# Patient Record
Sex: FEMALE | Race: WHITE | NOT HISPANIC OR LATINO | Employment: PART TIME | URBAN - METROPOLITAN AREA
[De-identification: names, ages, dates, MRNs, and addresses within clinical notes are randomized per-mention and may not be internally consistent; named-entity substitution may affect disease eponyms.]

---

## 2017-10-23 ENCOUNTER — TELEPHONE (OUTPATIENT)
Dept: ENDOCRINOLOGY | Facility: CLINIC | Age: 47
End: 2017-10-23

## 2017-10-23 DIAGNOSIS — C73 PAPILLARY THYROID CARCINOMA (H): ICD-10-CM

## 2017-10-23 NOTE — TELEPHONE ENCOUNTER
[facility] Call Center    Phone Message    Name of Caller: Addi    Phone Number: Home number on file [phone]    Best time to return call: ANY    May a detailed message be left on voicemail: yes    Relation to patient: Self    Reason for Call: Other: Patient will need refills, Please advise what she should do since Tasma is booked until end of February     Action Taken: Message routed to:  Adult Clinics: Endocrinology p [phone]

## 2017-10-24 RX ORDER — LEVOTHYROXINE SODIUM 75 MCG
75 TABLET ORAL DAILY
Qty: 120 TABLET | Refills: 0 | Status: SHIPPED | OUTPATIENT
Start: 2017-10-24 | End: 2017-12-02

## 2017-12-02 DIAGNOSIS — C73 PAPILLARY THYROID CARCINOMA (H): ICD-10-CM

## 2017-12-04 RX ORDER — LEVOTHYROXINE SODIUM 75 MCG
TABLET ORAL
Qty: 90 TABLET | Refills: 0 | Status: SHIPPED | OUTPATIENT
Start: 2017-12-04 | End: 2018-05-10

## 2018-02-26 ENCOUNTER — OFFICE VISIT (OUTPATIENT)
Dept: ENDOCRINOLOGY | Facility: CLINIC | Age: 48
End: 2018-02-26
Payer: COMMERCIAL

## 2018-02-26 VITALS
HEIGHT: 62 IN | HEART RATE: 102 BPM | SYSTOLIC BLOOD PRESSURE: 173 MMHG | DIASTOLIC BLOOD PRESSURE: 111 MMHG | OXYGEN SATURATION: 100 % | BODY MASS INDEX: 19.64 KG/M2 | WEIGHT: 106.7 LBS

## 2018-02-26 DIAGNOSIS — C73 PAPILLARY THYROID CARCINOMA (H): ICD-10-CM

## 2018-02-26 DIAGNOSIS — E89.0 POSTOPERATIVE HYPOTHYROIDISM: Primary | ICD-10-CM

## 2018-02-26 LAB
CALCIUM SERPL-MCNC: 8.9 MG/DL (ref 8.5–10.1)
T3 SERPL-MCNC: 93 NG/DL (ref 60–181)
T4 FREE SERPL-MCNC: 1.11 NG/DL (ref 0.76–1.46)
TSH SERPL DL<=0.005 MIU/L-ACNC: 1.04 MU/L (ref 0.4–4)

## 2018-02-26 PROCEDURE — 36415 COLL VENOUS BLD VENIPUNCTURE: CPT | Performed by: INTERNAL MEDICINE

## 2018-02-26 PROCEDURE — 82310 ASSAY OF CALCIUM: CPT | Performed by: INTERNAL MEDICINE

## 2018-02-26 PROCEDURE — 84480 ASSAY TRIIODOTHYRONINE (T3): CPT | Performed by: INTERNAL MEDICINE

## 2018-02-26 PROCEDURE — 99214 OFFICE O/P EST MOD 30 MIN: CPT | Performed by: INTERNAL MEDICINE

## 2018-02-26 PROCEDURE — 84439 ASSAY OF FREE THYROXINE: CPT | Performed by: INTERNAL MEDICINE

## 2018-02-26 PROCEDURE — 99000 SPECIMEN HANDLING OFFICE-LAB: CPT | Performed by: INTERNAL MEDICINE

## 2018-02-26 PROCEDURE — 82306 VITAMIN D 25 HYDROXY: CPT | Performed by: INTERNAL MEDICINE

## 2018-02-26 PROCEDURE — 00000344 ZZHCL STATISTIC REMEASURE THYROGLOBULIN: Mod: 90 | Performed by: INTERNAL MEDICINE

## 2018-02-26 PROCEDURE — 86800 THYROGLOBULIN ANTIBODY: CPT | Mod: 90 | Performed by: INTERNAL MEDICINE

## 2018-02-26 PROCEDURE — 84432 ASSAY OF THYROGLOBULIN: CPT | Mod: 90 | Performed by: INTERNAL MEDICINE

## 2018-02-26 PROCEDURE — 84443 ASSAY THYROID STIM HORMONE: CPT | Performed by: INTERNAL MEDICINE

## 2018-02-26 RX ORDER — LEVOTHYROXINE SODIUM 75 MCG
75 TABLET ORAL DAILY
Qty: 90 TABLET | Refills: 3 | Status: CANCELLED | OUTPATIENT
Start: 2018-02-26

## 2018-02-26 NOTE — LETTER
Patient:  Addi Flores  :   1970  MRN:     [id number]        Ms.Jayme SHEYLA Flores  [address] [facility] [address]        2018    Dear Ms.Zachman Flores,    We are writing to inform you of your test results.    Thyroglobulin (thyroid tumor marker) is stable from previous year.   Please take Synthroid 88 mcg daily and repeat lab in 6 weeks.     Resulted Orders   T4 free   Result Value Ref Range    T4 Free 1.11 0.76 - 1.46 ng/dL   TSH   Result Value Ref Range    TSH 1.04 0.40 - 4.00 mU/L   T3 total   Result Value Ref Range    Triiodothyronine (T3) 93 60 - 181 ng/dL   Thyroglobulin (Total, antibody & recovery %)   Result Value Ref Range    Lab Scanned Result THYROG-Scanned    Vitamin D Deficiency (D3 Only)   Result Value Ref Range    Vitamin D Deficiency screening 25 20 - 75 ug/L      Comment:      Season, race, dietary intake, and treatment affect the concentration of   25-hydroxy-Vitamin D. Values may decrease during winter months and increase   during summer months. Values 20-29 ug/L may indicate Vitamin D insufficiency   and values <20 ug/L may indicate Vitamin D deficiency.  Vitamin D determination is routinely performed by an immunoassay specific for   25 hydroxyvitamin D3.  If an individual is on vitamin D2 (ergocalciferol)   supplementation, please specify 25 OH vitamin D2 and D3 level determination by   LCMSMS test [id number].     Calcium   Result Value Ref Range    Calcium 8.9 8.5 - 10.1 mg/dL     If you have any questions, please do not hesitate to call [facility] at [phone] and ask for Endocrinology clinic.    Sincerely,    Ifrah Rodrigues MD  Endocrinology          [phone]  1970

## 2018-02-26 NOTE — NURSING NOTE
"Addi Flores's goals for this visit include: Follow up Papillary Thyroid Cancer  She requests these members of her care team be copied on today's visit information: YES    PCP: Nichelle Melo    Referring Provider:  No referring provider defined for this encounter.    Chief Complaint   Patient presents with     RECHECK     Papillary Thyroid Cancer       Initial Ht 1.575 m (5' 2\")  Wt 48.4 kg (106 lb 11.2 oz)  LMP 06/21/2008  BMI 19.52 kg/m2 Estimated body mass index is 19.52 kg/(m^2) as calculated from the following:    Height as of this encounter: 1.575 m (5' 2\").    Weight as of this encounter: 48.4 kg (106 lb 11.2 oz).  Medication Reconciliation: complete    Do you need any medication refills at today's visit? YES    "

## 2018-02-26 NOTE — MR AVS SNAPSHOT
After Visit Summary   2/26/2018    Addi Flores    MRN: [id number]           Patient Information     Date Of Birth          1970        Visit Information        Provider Department      2/26/2018 1:00 PM Ifrah Rodrigues MD [facility]        Today's Diagnoses     Papillary thyroid carcinoma (H)          Care Instructions    CALCIUM Recommendation 1200 mg/day in divided dose of no more than 500 mg/dose. This includes what is in your food and also what is in any supplements you are taking.      Dietary sources of calcium: These also contain vitamin D  Milk / buttermilk              8 oz            300 mg  Dry milk powder       5 Tbsp             300 mg  Yogurt                          1 cup           400 mg  Ice cream                    1/2 cup          100 mg  Hard cheese                     1.5 oz          300 mg  Cottage cheese                1/2 cup        65 mg  Spinach, cooked          1 cup              240 mg  Tofu, soft regular           4 oz          120 to 390 mg  Sardines                       3 oz               370 mg  Mackerel canned         3 oz                250 mg  Canned salmon with bones 3 oz        170-210 mg  Calcium fortified cereals are available  SILK soy and almond milk products are calcium fortified  Orange juice  Fortified with Calcium       8 oz            300 mg  Low fat dairy sources are recommended      Recommended exercise goals:    30 minutes of moderate exercise on most days of the week .  Weight bearing exercise (ie, walking, jogging, hiking, dancing)    Strength training 2 or more times/week in addition to other weight -being exercise.  Strength training -- uses weight or resistance beyond that seen in everyday activities -(pilates, weight training with free weights, weight machines or resistance bands)    Please allow 7-10 business days for your lab results. You will be notified by phone, letter, or My Chart after the  provider has reviewed them.  Thank you.     Please call [phone] to schedule follow up in one year.          Follow-ups after your visit        Follow-up notes from your care team     Return in about 1 year (around 2/26/2019) for Lab Work Today.      Future tests that were ordered for you today     Open Future Orders        Priority Expected Expires Ordered    T4 free Routine 2/26/2018 2/26/2019 2/26/2018    TSH Routine 2/26/2018 2/26/2019 2/26/2018    T3 total Routine 2/26/2018 2/26/2019 2/26/2018    Thyroglobulin (Total, antibody & recovery %) Routine 2/26/2018 2/26/2019 2/26/2018    Vitamin D Deficiency (D3 Only) Routine 2/26/2018 2/26/2019 2/26/2018    Calcium Routine 2/26/2018 2/26/2019 2/26/2018            Who to contact     If you have questions or need follow up information about today's clinic visit or your schedule please contact [facility] directly at [phone].  Normal or non-critical lab and imaging results will be communicated to you by Isabella Oliverhart, letter or phone within 4 business days after the clinic has received the results. If you do not hear from us within 7 days, please contact the clinic through Isabella Oliverhart or phone. If you have a critical or abnormal lab result, we will notify you by phone as soon as possible.  Submit refill requests through Dig[employer] or call your pharmacy and they will forward the refill request to us. Please allow 3 business days for your refill to be completed.          Additional Information About Your Visit        Digiboo Information     Dig[employer]oo is an electronic gateway that provides easy, online access to your medical records. With Dig[employer]oo, you can request a clinic appointment, read your test results, renew a prescription or communicate with your care team.     To sign up for Digiboo visit the website at [email or web address]   You will be asked to enter the access code listed below, as well as some personal information. Please follow the  "directions to create your username and password.     Your access code is: [email or web address]  Expires: 2018  1:48 PM     Your access code will  in 90 days. If you need help or a new code, please contact your [facility] or call [phone] for assistance.        Care EveryWhere ID     This is your Care EveryWhere ID. This could be used by other organizations to access your [place] medical records  [id number]        Your Vitals Were     Pulse Height Last Period Pulse Oximetry BMI (Body Mass Index)       102 1.575 m (5' 2\") 2008 100% 19.52 kg/m2        Blood Pressure from Last 3 Encounters:   18 (!) 173/111   10/03/16 (!) 177/109   08 159/104    Weight from Last 3 Encounters:   18 48.4 kg (106 lb 11.2 oz)   10/03/16 44.7 kg (98 lb 9.6 oz)   08 41.7 kg (92 lb)               Primary Care Provider Office Phone # Fax #    Nichelle Melo [phone]       [facility] [address]  [place] [facility] [address]        Equal Access to Services     NITA DIAZ AH: Hadii aad ku hadasho Soomaali, waaxda luqadaha, qaybta kaalmada adeegyada, waxay idiin hayaan harshil khyared lapatricia bonilla. So [facility] [phone].    ATENCIÓN: Si habla español, tiene a osei disposición servicios gratuitos de asistencia lingüística. lance al [phone].    We comply with applicable federal civil rights laws and [place] laws. We do not discriminate on the basis of race, color, national origin, age, disability, sex, sexual orientation, or gender identity.            Thank you!     Thank you for choosing [facility]  for your care. Our goal is always to provide you with excellent care. Hearing back from our patients is one way we can continue to improve our services. Please take a few minutes to complete the written survey that you may receive in the mail after your visit with us. Thank you!             Your Updated Medication List - Protect others " around you: Learn how to safely use, store and throw away your medicines at www.disposemymeds.org.          This list is accurate as of 2/26/18  1:48 PM.  Always use your most recent med list.                   Brand Name Dispense Instructions for use Diagnosis    METOPROLOL SUCCINATE ER PO      Take 50 mg by mouth daily    Papillary thyroid carcinoma (H)       SYNTHROID 75 MCG tablet   Generic drug:  levothyroxine     90 tablet    TAKE 1 TABLET BY MOUTH DAILY    Papillary thyroid carcinoma (H)

## 2018-02-26 NOTE — LETTER
2/26/2018         RE: Addi Flores  [address] [facility] [address]        Dear Colleague,    Thank you for referring your patient, Addi Flores, to the [facility]. Please see a copy of my visit note below.         Endocrinology Note         Addi is a 47 year old female presents today for papillary thyroid cancer    HPI  Addi is a 47 years old female with hx of papillary thyroid cancer who is here to establish care. She was previously seen , endocrinologist, last seen 10/2016.    She was diagnosed with papillary thyroid cancer in 2007 after she noted to have palpable thyroid nodule by her dermatologist. She did have US guided FNA of the nodule that showed enlarged nuclei in the thyroid parenchyma with focally papillary architecture. There were intranuclear inclusion and the result was suspicious. Given suspicious result, she underwent thyroidectomy which was consistent with right follicular variant of papillary thyroid cancer sized 1.9x1.4x1 cm with no extrathyroidal extension and margins were negative. She received I131 radioactive iodine treatment 100.4 mCi on 9/24/2007.    She did have low stimulated thyroglobulin and negative WBS uptake in 2008.    She has had hypertension particularly while she came to doctor's office. She had full evaluation for secondary hypertension including 24 hours metanephrine, cortisol and VMA which were all negative. She told me that her home blood pressures were good. She did have hysterectomy with preserved one ovary due to ovarian cyst around 2008.     She is doing relatively well. She is currently on brand name Synthroid 75 mcg daily for the past few years. It was reduced from 88 mcg in 2015. She stated that she has always felt slightly fatigue.  She also noticed multiple joints ache in her wrist, elbow and knees.  She reports that her fingers turned white in cold temperature. She stated that she has gained weight  over the past year.  She denies chest pain, shortness of breath, temperature intolerance, altered bowel movement. She stated that she gets anxious easily.     Labs in October 2016 showed TSH 0.7, free T4 1.22, thyroglobulin 1.2 and thyroglobulin antibodies less than 0.4.    She is wondering whether I can check T3 today.  She is not taking supplement containing Biotin.    Past Medical History  Past Medical History:   Diagnosis Date     Thyroid cancer (H) 2007     Allergies  Allergies   Allergen Reactions     Penicillins      Medications  Current Outpatient Prescriptions   Medication Sig Dispense Refill     SYNTHROID 75 MCG tablet TAKE 1 TABLET BY MOUTH DAILY 90 tablet 0     METOPROLOL SUCCINATE ER PO Take 50 mg by mouth daily       Family History  family history includes HEART DISEASE in her father; Hypertension in her father and mother; Lipids in her father and mother. There is no history of Breast Cancer, Cancer - colorectal, or Prostate Cancer.     No thyroid cancer in the family.     Social History  Social History   Substance Use Topics     Smoking status: Never Smoker     Smokeless tobacco: Never Used     Alcohol use Yes      Comment: occasional   , has 2 children  She works as  in high school    ROS  Constitutional: Gradual weight gain, slightly low energy.  Eyes: no vision change, diplopia or red eyes   Neck: no difficulty swallowing, no choking, no neck pain, no neck swelling  Cardiovascular: no chest pain, palpitations  Respiratory: no dyspnea, cough, shortness of breath or wheezing   GI: no nausea, vomiting, some loose stool but diarrhea or constipation, no abdominal pain   : no change in urine, no dysuria or hematuria  Musculoskeletal:  complaint of multiple joints pain including wrist elbow and knees  Integumentary:  finger turned white in a cold temperature  Neuro: no loss of strength or sensation, no numbness or tingling, no tremor, no dizziness, no headache   Endo: no  "polyuria or polydipsia, no temperature intolerance   Heme/Lymph: no concerning bumps, no bleeding problems   Allergy: no environmental allergies   Psych: +anxiety, no sleep problems.    Physical Exam  Ht 1.575 m (5' 2\")  Wt 48.4 kg (106 lb 11.2 oz)  LMP 06/21/2008  BMI 19.52 kg/m2  Body mass index is 19.52 kg/(m^2).  Constitutional: no distress, comfortable, pleasant   Eyes: anicteric, normal extra-ocular movements, no lid lag or retraction  Neck: well healed scar, no cervical mass palpated  Cardiovascular: tachycardic, normal S1 and S2, no murmurs  Respiratory: clear to auscultation, no wheezes or crackles, normal breath sounds   Gastrointestinal:  nontender, no hepatomegaly, no masses   Musculoskeletal: no edema   Skin: no concerning lesions, no jaundice   Neurological: cranial nerves intact, 2+ reflexes at patella, normal gait, +mild tremor on outstretched hands bilaterally  Psychological: appropriate mood   Lymphatic: no cervical  lymphadenopathy.      RESULTS  Surgical pathology: 2007  Papillary thyroid cancer sized 1.9x1.4x1 cm with no extrathyroidal extension. Margins were negative and there was no evidence on the left side.    Outside lab  Lab from [facility]  7/19/2007: TPO1.5 (0-8.9).   9/14/2007: TSH 34.4, Tg 2.8 (0-33), ROXANN <1.8  11/5/2007: TSH 1.46, FT4 1.2  12/27/2007: TSH 0.05  2/25/2008: TSH 0.64  5/19/2008: TSH 0.05, FT4 1.5  8/8/2008: TSH 0.54, FT4 1.3, Tg 0.4, ROXANN <1.8  11/24/2008: TSH 0.05, FT4 1.7, Tg 1.6, ROXANN <1.8  1/29/2009: TSH 0.06, FT4 1.3  6/1/2009; TSH 0.04, FT4 1.3, Tg 0.3, ROXANN<1.8  4/20/2010: TSH 0.02, FT4 1.5, Tg 0.4, ROXANN <1.8  8/9/2010: TSH 0.02, FT4 1.4  10/21/2010: TSH 0.02, FT4 1.4  12/21/2010: TSH 0.12, FT4 1.2  7/5/2011: TSH 0.28, FT4 1.2, Tg 0.6, ROXANN<20  6/25/2012: TSH 0.27, FT4 1.4, Tg 0.9, ROXANN<20  9/12/2013: TSH 0.82, FT4 1.4, Tg 1.1, ROXANN <20  10/28/2013: TSH 0.22, FT4 1.5, Tg 0.8, ROXANN<20    Lab from  clinic  10/3/2016 showed TSH 0.7, free T4 1.22, thyroglobulin 1.2 and " thyroglobulin antibodies less than 0.4.    Lab from [facility]  6/25/14: TSH 0.53  9/24/15: TSH 0.22  12/29/15: TSH 1.09    Imaging from [facility]  9/19/2007: I123 scan -- no focal area of residual uptake within thyroid bed. There was residual uptake within the thyroid bed. 24 hour uptake 2.2%.  9/28/2007: post treatment scan -- focus of increased activity in the thyroid bed consistent with residual thyroid activity and focus of activity in the left hemipelvis which could like within the ovary.   10/4/2007: CT scan: 4.2 cm low attenuation lesion within the left adnexa likely representing of ovarian origin.   9/17/2008: US thyroid -- 5 mm echogenic area in the left thyroid bed with posterior shadowing. No evidence of residual thyroid tissue   11/26/2008: WBS -- very faint midline neck activity  4/20/2010: US thyroid -- no residual or recurrent mass seen in thyroid bed.  7/5/2011: US thyroid -- negative exam  6/25/2012: US thyroid -- postsurgical changes of thyroidectomy with no sonographic findings of recurrence.      ASSESSMENT:    Addi is a 47 years old female with hx of papillary thyroid cancer who is here to follow-up papillary thyroid cancer s/p total thyroidectomy and I131 radioactive iodine ablation    1) Papillary thyroid cancer s/p total thyroidectomy and I131 radioactive iodine ablation: diagnosed in 2007. Pathology showed follicular variant of PTC without lymph node metastasis.   Stimulated Tg was low.  Ultrasound neck in 2012 did not show any suspicious finding she is low risk.  Will check TSH, FT4 and Tg today.    2) Postsurgical/ablative hypothyroidism: She has had PTC for long time and was on suppression therapy many years after surgery. I think we do not need to keep on suppressed TSH level given low risk.   Will check TSH and FT4 today.    PLAN:   Check TSH, FT4 and Thyroglobulin today  RTC 1 year    ENDO THYROID LABS-P Latest Ref Rng & Units 2/26/2018   TSH 0.40 - 4.00 mU/L 1.04   T4 FREE  0.76 - 1.46 ng/dL 1.11   TRIIODOTHYRONINE(T3) 60 - 181 ng/dL 93     Will increase Synthroid to 88 mcg daily  Repeat lab in 2 months    Ifrah Rodrigues MD     Division of Diabetes and Endocrinology  Department of Medicine  [phone]

## 2018-02-26 NOTE — PROGRESS NOTES
Endocrinology Note         Addi is a 47 year old female presents today for papillary thyroid cancer    HPI  Addi is a 47 years old female with hx of papillary thyroid cancer who is here to establish care. She was previously seen , endocrinologist, last seen 10/2016.    She was diagnosed with papillary thyroid cancer in 2007 after she noted to have palpable thyroid nodule by her dermatologist. She did have US guided FNA of the nodule that showed enlarged nuclei in the thyroid parenchyma with focally papillary architecture. There were intranuclear inclusion and the result was suspicious. Given suspicious result, she underwent thyroidectomy which was consistent with right follicular variant of papillary thyroid cancer sized 1.9x1.4x1 cm with no extrathyroidal extension and margins were negative. She received I131 radioactive iodine treatment 100.4 mCi on 9/24/2007.    She did have low stimulated thyroglobulin and negative WBS uptake in 2008.    She has had hypertension particularly while she came to doctor's office. She had full evaluation for secondary hypertension including 24 hours metanephrine, cortisol and VMA which were all negative. She told me that her home blood pressures were good. She did have hysterectomy with preserved one ovary due to ovarian cyst around 2008.     She is doing relatively well. She is currently on brand name Synthroid 75 mcg daily for the past few years. It was reduced from 88 mcg in 2015. She stated that she has always felt slightly fatigue.  She also noticed multiple joints ache in her wrist, elbow and knees.  She reports that her fingers turned white in cold temperature. She stated that she has gained weight over the past year.  She denies chest pain, shortness of breath, temperature intolerance, altered bowel movement. She stated that she gets anxious easily.     Labs in October 2016 showed TSH 0.7, free T4 1.22, thyroglobulin 1.2 and thyroglobulin antibodies less than  "0.4.    She is wondering whether I can check T3 today.  She is not taking supplement containing Biotin.    Past Medical History  Past Medical History:   Diagnosis Date     Thyroid cancer (H) 2007     Allergies  Allergies   Allergen Reactions     Penicillins      Medications  Current Outpatient Prescriptions   Medication Sig Dispense Refill     SYNTHROID 75 MCG tablet TAKE 1 TABLET BY MOUTH DAILY 90 tablet 0     METOPROLOL SUCCINATE ER PO Take 50 mg by mouth daily       Family History  family history includes HEART DISEASE in her father; Hypertension in her father and mother; Lipids in her father and mother. There is no history of Breast Cancer, Cancer - colorectal, or Prostate Cancer.     No thyroid cancer in the family.     Social History  Social History   Substance Use Topics     Smoking status: Never Smoker     Smokeless tobacco: Never Used     Alcohol use Yes      Comment: occasional   , has 2 children  She works as  in high school    ROS  Constitutional: Gradual weight gain, slightly low energy.  Eyes: no vision change, diplopia or red eyes   Neck: no difficulty swallowing, no choking, no neck pain, no neck swelling  Cardiovascular: no chest pain, palpitations  Respiratory: no dyspnea, cough, shortness of breath or wheezing   GI: no nausea, vomiting, some loose stool but diarrhea or constipation, no abdominal pain   : no change in urine, no dysuria or hematuria  Musculoskeletal:  complaint of multiple joints pain including wrist elbow and knees  Integumentary:  finger turned white in a cold temperature  Neuro: no loss of strength or sensation, no numbness or tingling, no tremor, no dizziness, no headache   Endo: no polyuria or polydipsia, no temperature intolerance   Heme/Lymph: no concerning bumps, no bleeding problems   Allergy: no environmental allergies   Psych: +anxiety, no sleep problems.    Physical Exam  Ht 1.575 m (5' 2\")  Wt 48.4 kg (106 lb 11.2 oz)  LMP 06/21/2008  BMI " 19.52 kg/m2  Body mass index is 19.52 kg/(m^2).  Constitutional: no distress, comfortable, pleasant   Eyes: anicteric, normal extra-ocular movements, no lid lag or retraction  Neck: well healed scar, no cervical mass palpated  Cardiovascular: tachycardic, normal S1 and S2, no murmurs  Respiratory: clear to auscultation, no wheezes or crackles, normal breath sounds   Gastrointestinal:  nontender, no hepatomegaly, no masses   Musculoskeletal: no edema   Skin: no concerning lesions, no jaundice   Neurological: cranial nerves intact, 2+ reflexes at patella, normal gait, +mild tremor on outstretched hands bilaterally  Psychological: appropriate mood   Lymphatic: no cervical  lymphadenopathy.      RESULTS  Surgical pathology: 2007  Papillary thyroid cancer sized 1.9x1.4x1 cm with no extrathyroidal extension. Margins were negative and there was no evidence on the left side.    Outside lab  Lab from [facility]  7/19/2007: TPO1.5 (0-8.9).   9/14/2007: TSH 34.4, Tg 2.8 (0-33), ROXANN <1.8  11/5/2007: TSH 1.46, FT4 1.2  12/27/2007: TSH 0.05  2/25/2008: TSH 0.64  5/19/2008: TSH 0.05, FT4 1.5  8/8/2008: TSH 0.54, FT4 1.3, Tg 0.4, ROXANN <1.8  11/24/2008: TSH 0.05, FT4 1.7, Tg 1.6, ROXANN <1.8  1/29/2009: TSH 0.06, FT4 1.3  6/1/2009; TSH 0.04, FT4 1.3, Tg 0.3, ROXANN<1.8  4/20/2010: TSH 0.02, FT4 1.5, Tg 0.4, ROXANN <1.8  8/9/2010: TSH 0.02, FT4 1.4  10/21/2010: TSH 0.02, FT4 1.4  12/21/2010: TSH 0.12, FT4 1.2  7/5/2011: TSH 0.28, FT4 1.2, Tg 0.6, ROXANN<20  6/25/2012: TSH 0.27, FT4 1.4, Tg 0.9, ROXANN<20  9/12/2013: TSH 0.82, FT4 1.4, Tg 1.1, ROXANN <20  10/28/2013: TSH 0.22, FT4 1.5, Tg 0.8, ROXANN<20    Lab from [facility]  10/3/2016 showed TSH 0.7, free T4 1.22, thyroglobulin 1.2 and thyroglobulin antibodies less than 0.4.    Lab from [facility]  6/25/14: TSH 0.53  9/24/15: TSH 0.22  12/29/15: TSH 1.09    Imaging from [facility]  9/19/2007: I123 scan -- no focal area of residual uptake within thyroid bed. There was residual uptake within the  thyroid bed. 24 hour uptake 2.2%.  9/28/2007: post treatment scan -- focus of increased activity in the thyroid bed consistent with residual thyroid activity and focus of activity in the left hemipelvis which could like within the ovary.   10/4/2007: CT scan: 4.2 cm low attenuation lesion within the left adnexa likely representing of ovarian origin.   9/17/2008: US thyroid -- 5 mm echogenic area in the left thyroid bed with posterior shadowing. No evidence of residual thyroid tissue   11/26/2008: WBS -- very faint midline neck activity  4/20/2010: US thyroid -- no residual or recurrent mass seen in thyroid bed.  7/5/2011: US thyroid -- negative exam  6/25/2012: US thyroid -- postsurgical changes of thyroidectomy with no sonographic findings of recurrence.      ASSESSMENT:    Addi is a 47 years old female with hx of papillary thyroid cancer who is here to follow-up papillary thyroid cancer s/p total thyroidectomy and I131 radioactive iodine ablation    1) Papillary thyroid cancer s/p total thyroidectomy and I131 radioactive iodine ablation: diagnosed in 2007. Pathology showed follicular variant of PTC without lymph node metastasis.   Stimulated Tg was low.  Ultrasound neck in 2012 did not show any suspicious finding she is low risk.  Will check TSH, FT4 and Tg today.    2) Postsurgical/ablative hypothyroidism: She has had PTC for long time and was on suppression therapy many years after surgery. I think we do not need to keep on suppressed TSH level given low risk.   Will check TSH and FT4 today.    PLAN:   Check TSH, FT4 and Thyroglobulin today  RTC 1 year    ENDO THYROID LABS-P Latest Ref Rng & Units 2/26/2018   TSH 0.40 - 4.00 mU/L 1.04   T4 FREE 0.76 - 1.46 ng/dL 1.11   TRIIODOTHYRONINE(T3) 60 - 181 ng/dL 93     Will increase Synthroid to 88 mcg daily  Repeat lab in 2 months    Ifrah Rodrigues MD     Division of Diabetes and Endocrinology  Department of Medicine  [phone]

## 2018-02-26 NOTE — PATIENT INSTRUCTIONS
CALCIUM Recommendation 1200 mg/day in divided dose of no more than 500 mg/dose. This includes what is in your food and also what is in any supplements you are taking.      Dietary sources of calcium: These also contain vitamin D  Milk / buttermilk              8 oz            300 mg  Dry milk powder       5 Tbsp             300 mg  Yogurt                          1 cup           400 mg  Ice cream                    1/2 cup          100 mg  Hard cheese                     1.5 oz          300 mg  Cottage cheese                1/2 cup        65 mg  Spinach, cooked          1 cup              240 mg  Tofu, soft regular           4 oz          120 to 390 mg  Sardines                       3 oz               370 mg  Mackerel canned         3 oz                250 mg  Canned salmon with bones 3 oz        170-210 mg  Calcium fortified cereals are available  SILK soy and almond milk products are calcium fortified  Orange juice  Fortified with Calcium       8 oz            300 mg  Low fat dairy sources are recommended      Recommended exercise goals:    30 minutes of moderate exercise on most days of the week .  Weight bearing exercise (ie, walking, jogging, hiking, dancing)    Strength training 2 or more times/week in addition to other weight -being exercise.  Strength training -- uses weight or resistance beyond that seen in everyday activities -(pilates, weight training with free weights, weight machines or resistance bands)    Please allow 7-10 business days for your lab results. You will be notified by phone, letter, or My Chart after the provider has reviewed them.  Thank you.     Please call [phone] to schedule follow up in one year.    Discuss BP with primary

## 2018-02-27 ENCOUNTER — MYC MEDICAL ADVICE (OUTPATIENT)
Dept: ENDOCRINOLOGY | Facility: CLINIC | Age: 48
End: 2018-02-27

## 2018-02-27 DIAGNOSIS — C73 PAPILLARY CARCINOMA OF THYROID (H): Primary | ICD-10-CM

## 2018-02-27 DIAGNOSIS — E89.0 POSTOPERATIVE HYPOTHYROIDISM: ICD-10-CM

## 2018-02-27 LAB — DEPRECATED CALCIDIOL+CALCIFEROL SERPL-MC: 25 UG/L (ref 20–75)

## 2018-02-27 RX ORDER — LEVOTHYROXINE SODIUM 88 MCG
88 TABLET ORAL DAILY
Qty: 90 TABLET | Refills: 3 | Status: SHIPPED | OUTPATIENT
Start: 2018-02-27 | End: 2018-05-10 | Stop reason: DRUGHIGH

## 2018-02-28 NOTE — TELEPHONE ENCOUNTER
Patient notified of lab results and recommendations. Lab orders placed. Patient will schedule lab appt in 2 months. Addi is agreeable with plan and has no further questions at this time.    Per Lilia Result Message:  Notes Recorded by Ifrah Rodrigues MD on 2/27/2018 at 3:55 PM  Please also ask her to repeat lab in 2 months after dose adjustment.    Thanks,  Ifrah Tipton LPN  Adult Endocrinology  [facility]

## 2018-02-28 NOTE — TELEPHONE ENCOUNTER
Contacted patient to review [employer] message sent to patient by Dr. Rodrigues regarding recent lab results.     Left voicemail for patient to contact our office.     Elma Burns RN  Endocrine Care Coordinator  [facility]

## 2018-03-04 ENCOUNTER — HEALTH MAINTENANCE LETTER (OUTPATIENT)
Age: 48
End: 2018-03-04

## 2018-03-07 LAB — LAB SCANNED RESULT: NORMAL

## 2018-05-01 DIAGNOSIS — C73 PAPILLARY CARCINOMA OF THYROID (H): ICD-10-CM

## 2018-05-01 DIAGNOSIS — E89.0 POSTOPERATIVE HYPOTHYROIDISM: ICD-10-CM

## 2018-05-01 LAB
T4 FREE SERPL-MCNC: 1.37 NG/DL (ref 0.76–1.46)
TSH SERPL DL<=0.005 MIU/L-ACNC: 0.07 MU/L (ref 0.4–4)

## 2018-05-01 PROCEDURE — 36415 COLL VENOUS BLD VENIPUNCTURE: CPT | Performed by: INTERNAL MEDICINE

## 2018-05-01 PROCEDURE — 84439 ASSAY OF FREE THYROXINE: CPT | Performed by: INTERNAL MEDICINE

## 2018-05-01 PROCEDURE — 84443 ASSAY THYROID STIM HORMONE: CPT | Performed by: INTERNAL MEDICINE

## 2018-05-09 ENCOUNTER — TELEPHONE (OUTPATIENT)
Dept: ENDOCRINOLOGY | Facility: CLINIC | Age: 48
End: 2018-05-09

## 2018-05-09 DIAGNOSIS — E89.0 POSTOPERATIVE HYPOTHYROIDISM: Primary | ICD-10-CM

## 2018-05-09 DIAGNOSIS — C73 PAPILLARY THYROID CARCINOMA (H): ICD-10-CM

## 2018-05-09 NOTE — TELEPHONE ENCOUNTER
[facility] Call Center    Phone Message    May a detailed message be left on voicemail: yes    Reason for Call: Patient requesting to go over lab results, patient did not think they looked good and is concerned. Requesting a call back as soon as anyone is able. Thank you    Action Taken: Message routed to:  Adult Clinics: Endocrinology p [phone]

## 2018-05-09 NOTE — TELEPHONE ENCOUNTER
5/1/18 labs as follows:    Component      Latest Ref Rng & Units 5/1/2018   TSH      0.40 - 4.00 mU/L 0.07 (L)   T4 Free      0.76 - 1.46 ng/dL 1.37       Per 2/27/18 [employer] message:  Your thyroid level is considered to be in acceptable range. However I would like to increase Synthroid back to 88 mcg to keep TSH close to 0.5.         Contacted patient to review in detail.     Patient was concerned that her TSH levels were so low. Patient was concerned she is hyperthyroid. Patient reports that she is needing less sleep and notes that her eyes are dry and she is not able to wear her contacts. Patient reports that she read online that hyperthyroidism can cause eye disease. Reviewed with patient Graves eye disease. Patient verbalizes understanding. Also advised patient of above information regarding labs and Dr. Rodrigues's plan to keep TSH close to 0.5. Patient verbalizes understanding.     Advised patient that writer will contact her when Dr. Rodrigues reviews labs. Patient verbalizes understanding and agrees to plan.       Elma Burns, RN  Endocrine Care Coordinator  [facility]

## 2018-05-10 RX ORDER — LEVOTHYROXINE SODIUM 88 MCG
88 TABLET ORAL DAILY
Qty: 90 TABLET | Refills: 1 | Status: SHIPPED | OUTPATIENT
Start: 2018-05-10 | End: 2019-05-05

## 2018-05-10 RX ORDER — LEVOTHYROXINE SODIUM 75 MCG
75 TABLET ORAL DAILY
Qty: 90 TABLET | Refills: 1 | Status: SHIPPED | OUTPATIENT
Start: 2018-05-10 | End: 2019-05-05

## 2018-05-10 NOTE — TELEPHONE ENCOUNTER
Patient advised. Patient verbalizes understanding. Patient questioned if it would be ok to alternate 75 mcg and 88 mcg or skip some days of 88 mcg tablets?    Will send to Dr. Rodrigues to review.       Elma Burns RN  Endocrine Care Coordinator  [facility]

## 2018-05-10 NOTE — TELEPHONE ENCOUNTER
Per Dr. Rodrigues:    Please let her know 88 mcg of LT4 is too much. We will need to go back to 75 mcg daily. Her symptoms are likely from the dose being too much. Repeat lab in 2 months after dose reduction.     Ifrah         Left voicemail for patient to contact our office.     Elma Burns RN  Endocrine Care Coordinator  [facility]

## 2018-08-08 DIAGNOSIS — E89.0 POSTOPERATIVE HYPOTHYROIDISM: ICD-10-CM

## 2018-08-08 DIAGNOSIS — C73 PAPILLARY THYROID CARCINOMA (H): ICD-10-CM

## 2018-08-08 LAB
T4 FREE SERPL-MCNC: 1.14 NG/DL (ref 0.76–1.46)
TSH SERPL DL<=0.005 MIU/L-ACNC: 1.63 MU/L (ref 0.4–4)

## 2018-08-08 PROCEDURE — 84439 ASSAY OF FREE THYROXINE: CPT | Performed by: INTERNAL MEDICINE

## 2018-08-08 PROCEDURE — 36415 COLL VENOUS BLD VENIPUNCTURE: CPT | Performed by: INTERNAL MEDICINE

## 2018-08-08 PROCEDURE — 84443 ASSAY THYROID STIM HORMONE: CPT | Performed by: INTERNAL MEDICINE

## 2019-05-05 DIAGNOSIS — E89.0 POSTOPERATIVE HYPOTHYROIDISM: ICD-10-CM

## 2019-05-05 DIAGNOSIS — C73 PAPILLARY THYROID CARCINOMA (H): ICD-10-CM

## 2019-05-06 RX ORDER — LEVOTHYROXINE SODIUM 88 MCG
TABLET ORAL
Qty: 30 TABLET | Refills: 0 | Status: SHIPPED | OUTPATIENT
Start: 2019-05-06 | End: 2019-06-03

## 2019-05-06 RX ORDER — LEVOTHYROXINE SODIUM 75 MCG
TABLET ORAL
Qty: 30 TABLET | Refills: 0 | Status: SHIPPED | OUTPATIENT
Start: 2019-05-06 | End: 2019-06-03

## 2019-05-06 NOTE — TELEPHONE ENCOUNTER
Patient last seen 2/26/18. No future appt scheduled.     Left message for patient to return call.    Silvia Tipton LPN  Clinic Coordinator  Adult Endocrinology and Pediatric Specialty Clinics  [facility]

## 2019-05-06 NOTE — TELEPHONE ENCOUNTER
Refill approved.    Silvia Tipton LPN  Diabetes Clinic Coordinator   Adult Endocrinology and Pediatric Specialty Clinics  [facility]

## 2019-06-03 ENCOUNTER — OFFICE VISIT (OUTPATIENT)
Dept: ENDOCRINOLOGY | Facility: CLINIC | Age: 49
End: 2019-06-03
Payer: COMMERCIAL

## 2019-06-03 VITALS
HEIGHT: 62 IN | DIASTOLIC BLOOD PRESSURE: 107 MMHG | WEIGHT: 102.6 LBS | SYSTOLIC BLOOD PRESSURE: 159 MMHG | OXYGEN SATURATION: 100 % | HEART RATE: 109 BPM | BODY MASS INDEX: 18.88 KG/M2

## 2019-06-03 DIAGNOSIS — C73 PAPILLARY THYROID CARCINOMA (H): Primary | ICD-10-CM

## 2019-06-03 DIAGNOSIS — E89.0 POSTOPERATIVE HYPOTHYROIDISM: ICD-10-CM

## 2019-06-03 LAB
T4 FREE SERPL-MCNC: 1.27 NG/DL (ref 0.76–1.46)
TSH SERPL DL<=0.005 MIU/L-ACNC: 0.66 MU/L (ref 0.4–4)

## 2019-06-03 PROCEDURE — 84244 ASSAY OF RENIN: CPT | Mod: 90 | Performed by: INTERNAL MEDICINE

## 2019-06-03 PROCEDURE — 86800 THYROGLOBULIN ANTIBODY: CPT | Mod: 90 | Performed by: INTERNAL MEDICINE

## 2019-06-03 PROCEDURE — 00000344 ZZHCL STATISTIC REMEASURE THYROGLOBULIN: Mod: 90 | Performed by: INTERNAL MEDICINE

## 2019-06-03 PROCEDURE — 83835 ASSAY OF METANEPHRINES: CPT | Mod: 90 | Performed by: INTERNAL MEDICINE

## 2019-06-03 PROCEDURE — 84439 ASSAY OF FREE THYROXINE: CPT | Performed by: INTERNAL MEDICINE

## 2019-06-03 PROCEDURE — 82088 ASSAY OF ALDOSTERONE: CPT | Performed by: INTERNAL MEDICINE

## 2019-06-03 PROCEDURE — 99000 SPECIMEN HANDLING OFFICE-LAB: CPT | Performed by: INTERNAL MEDICINE

## 2019-06-03 PROCEDURE — 84443 ASSAY THYROID STIM HORMONE: CPT | Performed by: INTERNAL MEDICINE

## 2019-06-03 PROCEDURE — 36415 COLL VENOUS BLD VENIPUNCTURE: CPT | Performed by: INTERNAL MEDICINE

## 2019-06-03 PROCEDURE — 99214 OFFICE O/P EST MOD 30 MIN: CPT | Performed by: INTERNAL MEDICINE

## 2019-06-03 PROCEDURE — 84432 ASSAY OF THYROGLOBULIN: CPT | Mod: 90 | Performed by: INTERNAL MEDICINE

## 2019-06-03 RX ORDER — LEVOTHYROXINE SODIUM 88 MCG
TABLET ORAL
Qty: 45 TABLET | Refills: 3 | Status: SHIPPED | OUTPATIENT
Start: 2019-06-03 | End: 2020-06-23

## 2019-06-03 RX ORDER — LEVOTHYROXINE SODIUM 75 MCG
TABLET ORAL
Qty: 45 TABLET | Refills: 3 | Status: SHIPPED | OUTPATIENT
Start: 2019-06-03 | End: 2020-06-23

## 2019-06-03 ASSESSMENT — MIFFLIN-ST. JEOR: SCORE: 1058.15

## 2019-06-03 NOTE — NURSING NOTE
Patient ok to leave per Dr. Rg, she will follow up with her primary to discuss elevated blood pressure.  Kelly Graff [facility]  Adult Endocrinology  [facility]

## 2019-06-03 NOTE — NURSING NOTE
"Addi Flores's goals for this visit include:   Chief Complaint   Patient presents with     Thyroid Disease     She requests these members of her care team be copied on today's visit information: Yes    PCP: Nichelle Melo    Referring Provider:  Nichelle Melo MD  [address]  [place] [address]    BP (!) 173/111 (BP Location: Left arm, Patient Position: Sitting, Cuff Size: Adult Regular)   Pulse 109   Ht 1.574 m (5' 1.97\")   Wt 46.5 kg (102 lb 9.6 oz)   LMP 06/21/2008   SpO2 100%   BMI 18.78 kg/m      Do you need any medication refills at today's visit? Yes - after labs are done    "

## 2019-06-03 NOTE — LETTER
6/3/2019         RE: Addi Flores  [address] [facility] [address]        Dear Colleague,    Thank you for referring your patient, Addi Flores, to the [facility]. Please see a copy of my visit note below.         Endocrinology Note         Addi is a 48 year old female presents today for papillary thyroid cancer    HPI  Addi is a 48 years old female with hx of papillary thyroid cancer who is here for follow up follicular variant of papillary thyroid cancer    She was diagnosed with papillary thyroid cancer in 2007 after she noted to have palpable thyroid nodule by her dermatologist. She did have US guided FNA of the nodule that showed enlarged nuclei in the thyroid parenchyma with focally papillary architecture. There were intranuclear inclusion and the result was suspicious. Given suspicious result, she underwent thyroidectomy which was consistent with right follicular variant of papillary thyroid cancer sized 1.9x1.4x1 cm with no extrathyroidal extension and margins were negative. She received I131 radioactive iodine treatment 100.4 mCi on 9/24/2007.    She did have low stimulated thyroglobulin and negative WBS uptake in 2008.    She has had hypertension particularly when she came to doctor's office. She had full evaluation for secondary hypertension including 24 hours metanephrine, cortisol and VMA which were all negative. Both systolic and diastolic blood pressure have been high. She takes metoprolol 50 mg daily. She told me that her home blood pressures were good. She did have hysterectomy with preserved one ovary due to ovarian cyst around 2008.     She is doing relatively well. She is currently on brand name Synthroid 75 mcg daily alternating with 88 mcg. She was noted to have positive CCP Ab. She also noticed multiple joints ache in her wrist, elbow and knees. She denies chest pain, shortness of breath, temperature intolerance, altered bowel movement.  She stated that she gets anxious easily.     Labs in 2/2018 showed TSH 1.04, free T4 1.11, thyroglobulin 1.7 and thyroglobulin antibodies less than 0.4.    Past Medical History  Past Medical History:   Diagnosis Date     Thyroid cancer (H) 2007     Allergies  Allergies   Allergen Reactions     Penicillins      Medications  Current Outpatient Medications   Medication Sig Dispense Refill     METOPROLOL SUCCINATE ER PO Take 50 mg by mouth daily       SYNTHROID 75 MCG tablet TAKE 1 TABLET BY MOUTH EVERY OTHER DAY, ALTERNATE WITH 88MCG 30 tablet 0     SYNTHROID 88 MCG tablet TAKE 1 TABLET BY MOUTH EVERY OTHER DAY ALTERNATE WITH 75 MCG 30 tablet 0     Family History  family history includes Heart Disease in her father; Hypertension in her father and mother; Lipids in her father and mother.     No thyroid cancer in the family.     Social History  Social History     Tobacco Use     Smoking status: Never Smoker     Smokeless tobacco: Never Used   Substance Use Topics     Alcohol use: Yes     Comment: occasional     Drug use: No   , has 2 children  She works as  in high school    ROS  Constitutional: Gradual weight gain, slightly low energy.  Eyes: no vision change, diplopia or red eyes   Neck: no difficulty swallowing, no choking, no neck pain, no neck swelling  Cardiovascular: no chest pain, palpitations  Respiratory: no dyspnea, cough, shortness of breath or wheezing   GI: no nausea, vomiting, some loose stool but diarrhea or constipation, no abdominal pain   : no change in urine, no dysuria or hematuria  Musculoskeletal:  complaint of multiple joints pain including wrist elbow and knees  Integumentary:  finger turned white in a cold temperature  Neuro: no loss of strength or sensation, no numbness or tingling, no tremor, no dizziness, no headache   Endo: no polyuria or polydipsia, no temperature intolerance   Heme/Lymph: no concerning bumps, no bleeding problems   Allergy: no environmental  "allergies   Psych: +anxiety, no sleep problems.    Physical Exam  BP (!) 159/107 (BP Location: Left arm, Patient Position: Sitting, Cuff Size: Adult Regular)   Pulse 109   Ht 1.574 m (5' 1.97\")   Wt 46.5 kg (102 lb 9.6 oz)   LMP 06/21/2008   SpO2 100%   BMI 18.78 kg/m     Body mass index is 18.78 kg/m .  Constitutional: no distress, comfortable, pleasant   Eyes: anicteric, normal extra-ocular movements, no lid lag or retraction  Neck: well healed scar, no cervical mass palpated  Cardiovascular: tachycardic, normal S1 and S2, no murmurs  Respiratory: clear to auscultation, no wheezes or crackles, normal breath sounds   Gastrointestinal:  nontender, no hepatomegaly, no masses   Musculoskeletal: no edema   Skin: no concerning lesions, no jaundice   Neurological: cranial nerves intact, 2+ reflexes at patella, normal gait, +mild tremor on outstretched hands bilaterally  Psychological: appropriate mood   Lymphatic: no cervical  lymphadenopathy.      RESULTS  Surgical pathology: 2007  Papillary thyroid cancer sized 1.9x1.4x1 cm with no extrathyroidal extension. Margins were negative and there was no evidence on the left side.    Outside lab  Lab from [facility]  7/19/2007: TPO1.5 (0-8.9).   9/14/2007: TSH 34.4, Tg 2.8 (0-33), RXOANN <1.8  11/5/2007: TSH 1.46, FT4 1.2  12/27/2007: TSH 0.05  2/25/2008: TSH 0.64  5/19/2008: TSH 0.05, FT4 1.5  8/8/2008: TSH 0.54, FT4 1.3, Tg 0.4, ROXANN <1.8  11/24/2008: TSH 0.05, FT4 1.7, Tg 1.6, ROXANN <1.8  1/29/2009: TSH 0.06, FT4 1.3  6/1/2009; TSH 0.04, FT4 1.3, Tg 0.3, ROXANN<1.8  4/20/2010: TSH 0.02, FT4 1.5, Tg 0.4, ROXANN <1.8  8/9/2010: TSH 0.02, FT4 1.4  10/21/2010: TSH 0.02, FT4 1.4  12/21/2010: TSH 0.12, FT4 1.2  7/5/2011: TSH 0.28, FT4 1.2, Tg 0.6, ROXANN<20  6/25/2012: TSH 0.27, FT4 1.4, Tg 0.9, ROXANN<20  9/12/2013: TSH 0.82, FT4 1.4, Tg 1.1, ROXANN <20  10/28/2013: TSH 0.22, FT4 1.5, Tg 0.8, ORXANN<20    Lab from  clinic  10/3/2016: TSH 0.7, free T4 1.22, thyroglobulin 1.2 and thyroglobulin " antibodies less than 0.4.  2/26/2018:  TSH 1.04, free T4 1.11, thyroglobulin 1.7 and thyroglobulin antibodies less than 0.4.  8/8/2018: TSH 1.63, FT4 1.14  6/3/2019: TSH 0.66, FT4 1.27    Lab from [facility]  6/25/14: TSH 0.53  9/24/15: TSH 0.22  12/29/15: TSH 1.09    Imaging from [facility]  9/19/2007: I123 scan -- no focal area of residual uptake within thyroid bed. There was residual uptake within the thyroid bed. 24 hour uptake 2.2%.  9/28/2007: post treatment scan -- focus of increased activity in the thyroid bed consistent with residual thyroid activity and focus of activity in the left hemipelvis which could like within the ovary.   10/4/2007: CT scan: 4.2 cm low attenuation lesion within the left adnexa likely representing of ovarian origin.   9/17/2008: US thyroid -- 5 mm echogenic area in the left thyroid bed with posterior shadowing. No evidence of residual thyroid tissue   11/26/2008: WBS -- very faint midline neck activity  4/20/2010: US thyroid -- no residual or recurrent mass seen in thyroid bed.  7/5/2011: US thyroid -- negative exam  6/25/2012: US thyroid -- postsurgical changes of thyroidectomy with no sonographic findings of recurrence.      ASSESSMENT:    Addi is a 47 years old female with hx of papillary thyroid cancer who is here to follow-up papillary thyroid cancer s/p total thyroidectomy and I131 radioactive iodine ablation    1) Papillary thyroid cancer s/p total thyroidectomy and I131 radioactive iodine ablation: diagnosed in 2007. Pathology showed follicular variant of PTC without lymph node metastasis.   Stimulated Tg was low.  Ultrasound neck in 2012 did not show any suspicious finding she is low risk.  Will check TSH, FT4 and Tg today.    2) Postsurgical/ablative hypothyroidism: She has had PTC for long time and was on suppression therapy many years after surgery. I think we do not need to keep on suppressed TSH level given low risk.   Will check TSH and FT4 today.    3)  Hypertension: both systolic and diastolic. Did have work up for secondary hypertension in the past which were negative. Currently on metoprolol ER 50 mg daily. BP has been always high. Reports good BP at home. I recommend to see nephrologist or discuss with PCP re: trial of addition of antihypertensive medications    PLAN:   Check TSH, FT4 and Thyroglobulin today  RTC 1 year    Results for AMRIT REYES (MRN [id number]) as of 6/4/2019 17:24   Ref. Range 6/3/2019 10:23   T4 Free Latest Ref Range: 0.76 - 1.46 ng/dL 1.27   TSH Latest Ref Range: 0.40 - 4.00 mU/L 0.66   Continue LT4 75 mcg alternating with 88 mcg every other day.    Ifrah Rodrigues MD     Division of Diabetes and Endocrinology  Department of Medicine  [phone]

## 2019-06-03 NOTE — PROGRESS NOTES
Endocrinology Note         Addi is a 48 year old female presents today for papillary thyroid cancer    HPI  Addi is a 48 years old female with hx of papillary thyroid cancer who is here for follow up follicular variant of papillary thyroid cancer    She was diagnosed with papillary thyroid cancer in 2007 after she noted to have palpable thyroid nodule by her dermatologist. She did have US guided FNA of the nodule that showed enlarged nuclei in the thyroid parenchyma with focally papillary architecture. There were intranuclear inclusion and the result was suspicious. Given suspicious result, she underwent thyroidectomy which was consistent with right follicular variant of papillary thyroid cancer sized 1.9x1.4x1 cm with no extrathyroidal extension and margins were negative. She received I131 radioactive iodine treatment 100.4 mCi on 9/24/2007.    She did have low stimulated thyroglobulin and negative WBS uptake in 2008.    She has had hypertension particularly when she came to doctor's office. She had full evaluation for secondary hypertension including 24 hours metanephrine, cortisol and VMA which were all negative. Both systolic and diastolic blood pressure have been high. She takes metoprolol 50 mg daily. She told me that her home blood pressures were good. She did have hysterectomy with preserved one ovary due to ovarian cyst around 2008.     She is doing relatively well. She is currently on brand name Synthroid 75 mcg daily alternating with 88 mcg. She was noted to have positive CCP Ab. She also noticed multiple joints ache in her wrist, elbow and knees. She denies chest pain, shortness of breath, temperature intolerance, altered bowel movement. She stated that she gets anxious easily.     Labs in 2/2018 showed TSH 1.04, free T4 1.11, thyroglobulin 1.7 and thyroglobulin antibodies less than 0.4.    Past Medical History  Past Medical History:   Diagnosis Date     Thyroid cancer (H) 2007  "    Allergies  Allergies   Allergen Reactions     Penicillins      Medications  Current Outpatient Medications   Medication Sig Dispense Refill     METOPROLOL SUCCINATE ER PO Take 50 mg by mouth daily       SYNTHROID 75 MCG tablet TAKE 1 TABLET BY MOUTH EVERY OTHER DAY, ALTERNATE WITH 88MCG 30 tablet 0     SYNTHROID 88 MCG tablet TAKE 1 TABLET BY MOUTH EVERY OTHER DAY ALTERNATE WITH 75 MCG 30 tablet 0     Family History  family history includes Heart Disease in her father; Hypertension in her father and mother; Lipids in her father and mother.     No thyroid cancer in the family.     Social History  Social History     Tobacco Use     Smoking status: Never Smoker     Smokeless tobacco: Never Used   Substance Use Topics     Alcohol use: Yes     Comment: occasional     Drug use: No   , has 2 children  She works as  in high school    ROS  Constitutional: Gradual weight gain, slightly low energy.  Eyes: no vision change, diplopia or red eyes   Neck: no difficulty swallowing, no choking, no neck pain, no neck swelling  Cardiovascular: no chest pain, palpitations  Respiratory: no dyspnea, cough, shortness of breath or wheezing   GI: no nausea, vomiting, some loose stool but diarrhea or constipation, no abdominal pain   : no change in urine, no dysuria or hematuria  Musculoskeletal:  complaint of multiple joints pain including wrist elbow and knees  Integumentary:  finger turned white in a cold temperature  Neuro: no loss of strength or sensation, no numbness or tingling, no tremor, no dizziness, no headache   Endo: no polyuria or polydipsia, no temperature intolerance   Heme/Lymph: no concerning bumps, no bleeding problems   Allergy: no environmental allergies   Psych: +anxiety, no sleep problems.    Physical Exam  BP (!) 159/107 (BP Location: Left arm, Patient Position: Sitting, Cuff Size: Adult Regular)   Pulse 109   Ht 1.574 m (5' 1.97\")   Wt 46.5 kg (102 lb 9.6 oz)   LMP 06/21/2008   " SpO2 100%   BMI 18.78 kg/m    Body mass index is 18.78 kg/m .  Constitutional: no distress, comfortable, pleasant   Eyes: anicteric, normal extra-ocular movements, no lid lag or retraction  Neck: well healed scar, no cervical mass palpated  Cardiovascular: tachycardic, normal S1 and S2, no murmurs  Respiratory: clear to auscultation, no wheezes or crackles, normal breath sounds   Gastrointestinal:  nontender, no hepatomegaly, no masses   Musculoskeletal: no edema   Skin: no concerning lesions, no jaundice   Neurological: cranial nerves intact, 2+ reflexes at patella, normal gait, +mild tremor on outstretched hands bilaterally  Psychological: appropriate mood   Lymphatic: no cervical  lymphadenopathy.      RESULTS  Surgical pathology: 2007  Papillary thyroid cancer sized 1.9x1.4x1 cm with no extrathyroidal extension. Margins were negative and there was no evidence on the left side.    Outside lab  Lab from [facility]  7/19/2007: TPO1.5 (0-8.9).   9/14/2007: TSH 34.4, Tg 2.8 (0-33), ROXANN <1.8  11/5/2007: TSH 1.46, FT4 1.2  12/27/2007: TSH 0.05  2/25/2008: TSH 0.64  5/19/2008: TSH 0.05, FT4 1.5  8/8/2008: TSH 0.54, FT4 1.3, Tg 0.4, ROXANN <1.8  11/24/2008: TSH 0.05, FT4 1.7, Tg 1.6, ROXANN <1.8  1/29/2009: TSH 0.06, FT4 1.3  6/1/2009; TSH 0.04, FT4 1.3, Tg 0.3, ROXANN<1.8  4/20/2010: TSH 0.02, FT4 1.5, Tg 0.4, ROXANN <1.8  8/9/2010: TSH 0.02, FT4 1.4  10/21/2010: TSH 0.02, FT4 1.4  12/21/2010: TSH 0.12, FT4 1.2  7/5/2011: TSH 0.28, FT4 1.2, Tg 0.6, ROXANN<20  6/25/2012: TSH 0.27, FT4 1.4, Tg 0.9, ROXANN<20  9/12/2013: TSH 0.82, FT4 1.4, Tg 1.1, ROXANN <20  10/28/2013: TSH 0.22, FT4 1.5, Tg 0.8, ROXANN<20    Lab from [facility]  10/3/2016: TSH 0.7, free T4 1.22, thyroglobulin 1.2 and thyroglobulin antibodies less than 0.4.  2/26/2018:  TSH 1.04, free T4 1.11, thyroglobulin 1.7 and thyroglobulin antibodies less than 0.4.  8/8/2018: TSH 1.63, FT4 1.14  6/3/2019: TSH 0.66, FT4 1.27    Lab from [facility]  6/25/14: TSH 0.53  9/24/15: TSH  0.22  12/29/15: TSH 1.09    Imaging from [facility]  9/19/2007: I123 scan -- no focal area of residual uptake within thyroid bed. There was residual uptake within the thyroid bed. 24 hour uptake 2.2%.  9/28/2007: post treatment scan -- focus of increased activity in the thyroid bed consistent with residual thyroid activity and focus of activity in the left hemipelvis which could like within the ovary.   10/4/2007: CT scan: 4.2 cm low attenuation lesion within the left adnexa likely representing of ovarian origin.   9/17/2008: US thyroid -- 5 mm echogenic area in the left thyroid bed with posterior shadowing. No evidence of residual thyroid tissue   11/26/2008: WBS -- very faint midline neck activity  4/20/2010: US thyroid -- no residual or recurrent mass seen in thyroid bed.  7/5/2011: US thyroid -- negative exam  6/25/2012: US thyroid -- postsurgical changes of thyroidectomy with no sonographic findings of recurrence.      ASSESSMENT:    Addi is a 47 years old female with hx of papillary thyroid cancer who is here to follow-up papillary thyroid cancer s/p total thyroidectomy and I131 radioactive iodine ablation    1) Papillary thyroid cancer s/p total thyroidectomy and I131 radioactive iodine ablation: diagnosed in 2007. Pathology showed follicular variant of PTC without lymph node metastasis.   Stimulated Tg was low.  Ultrasound neck in 2012 did not show any suspicious finding she is low risk.  Will check TSH, FT4 and Tg today.    2) Postsurgical/ablative hypothyroidism: She has had PTC for long time and was on suppression therapy many years after surgery. I think we do not need to keep on suppressed TSH level given low risk.   Will check TSH and FT4 today.    3) Hypertension: both systolic and diastolic. Did have work up for secondary hypertension in the past which were negative. Currently on metoprolol ER 50 mg daily. BP has been always high. Reports good BP at home. I recommend to see nephrologist or discuss  with PCP re: trial of addition of antihypertensive medications    PLAN:   Check TSH, FT4 and Thyroglobulin today  RTC 1 year    Results for AMRIT REYES (MRN [id number]) as of 6/4/2019 17:24   Ref. Range 6/3/2019 10:23   T4 Free Latest Ref Range: 0.76 - 1.46 ng/dL 1.27   TSH Latest Ref Range: 0.40 - 4.00 mU/L 0.66   Continue LT4 75 mcg alternating with 88 mcg every other day.    Ifrah Rodrigues MD     Division of Diabetes and Endocrinology  Department of Medicine  [phone]

## 2019-06-04 LAB — ALDOST SERPL-MCNC: 7.6 NG/DL (ref 0–31)

## 2019-06-05 LAB — RENIN PLAS-CCNC: 1.4 NG/ML/HR

## 2019-06-06 LAB
ANNOTATION COMMENT IMP: ABNORMAL
METANEPHS SERPL-SCNC: 0.55 NMOL/L (ref 0–0.49)
NORMETANEPHRINE SERPL-SCNC: 0.45 NMOL/L (ref 0–0.89)

## 2019-06-10 LAB — LAB SCANNED RESULT: NORMAL

## 2020-02-23 ENCOUNTER — HEALTH MAINTENANCE LETTER (OUTPATIENT)
Age: 50
End: 2020-02-23

## 2020-06-08 ENCOUNTER — VIRTUAL VISIT (OUTPATIENT)
Dept: ENDOCRINOLOGY | Facility: CLINIC | Age: 50
End: 2020-06-08
Payer: COMMERCIAL

## 2020-06-08 DIAGNOSIS — E89.0 POSTOPERATIVE HYPOTHYROIDISM: ICD-10-CM

## 2020-06-08 DIAGNOSIS — C73 PAPILLARY THYROID CARCINOMA (H): Primary | ICD-10-CM

## 2020-06-08 PROCEDURE — 99212 OFFICE O/P EST SF 10 MIN: CPT | Mod: TEL | Performed by: INTERNAL MEDICINE

## 2020-06-08 NOTE — PROGRESS NOTES
"Addi Flores is a 49 year old female who is being evaluated via a billable telephone visit.      The patient has been notified of following:     \"This telephone visit will be conducted via a call between you and your physician/provider. We have found that certain health care needs can be provided without the need for a physical exam.  This service lets us provide the care you need with a short phone conversation.  If a prescription is necessary we can send it directly to your pharmacy.  If lab work is needed we can place an order for that and you can then stop by our lab to have the test done at a later time.    Telephone visits are billed at different rates depending on your insurance coverage. During this emergency period, for some insurers they may be billed the same as an in-person visit.  Please reach out to your insurance provider with any questions.    If during the course of the call the physician/provider feels a telephone visit is not appropriate, you will not be charged for this service.\"    Patient has given verbal consent for Telephone visit?  Yes    What phone number would you like to be contacted at? [phone]    How would you like to obtain your AVS? MyChart          Endocrinology Note         Addi is a 49 year old femalewho has telephone visit today for papillary thyroid cancer    HPI  Addi is a 49 years old female with anxiety, hx of papillary thyroid cancer who has telephone visit for follow up follicular variant of papillary thyroid cancer    She was diagnosed with papillary thyroid cancer in 2007 after she noted to have palpable thyroid nodule by her dermatologist. She did have US guided FNA of the nodule that showed enlarged nuclei in the thyroid parenchyma with focally papillary architecture. There were intranuclear inclusion and the result was suspicious. Given suspicious result, she underwent thyroidectomy which was consistent with right follicular variant of papillary " thyroid cancer sized 1.9x1.4x1 cm with no extrathyroidal extension and margins were negative. She received I131 radioactive iodine treatment 100.4 mCi on 9/24/2007.    She did have low stimulated thyroglobulin and negative WBS uptake in 2008.    She has had hypertension particularly when she came to doctor's office. She had full evaluation for secondary hypertension including 24 hours metanephrine, cortisol and VMA which were all negative. Both systolic and diastolic blood pressure have been high. She takes metoprolol 50 mg daily. She told me that her home blood pressures were good. She did have hysterectomy with preserved one ovary due to ovarian cyst around 2008.     Interim history  She has been off over the past few months during COVID outbreak. She has been doing very well since last week.  She is currently on brand name Synthroid 75 mcg daily alternating with 88 mcg. She denies chest pain, shortness of breath, temperature intolerance, altered bowel movement. She has had some insomnia that is related to anxiety. She has not notice any lump in her neck. No difficulty swallowing or breathing.    Labs on 6/3/2019 showed TSH 0.66, FT4 1.27, thyroglobulin 1.5, TgAb <0.4    Past Medical History  Past Medical History:   Diagnosis Date     Thyroid cancer (H) 2007     Allergies  Allergies   Allergen Reactions     Penicillins      Medications  Current Outpatient Medications   Medication Sig Dispense Refill     METOPROLOL SUCCINATE ER PO Take 50 mg by mouth daily       SYNTHROID 75 MCG tablet TAKE 1 TABLET BY MOUTH EVERY OTHER DAY, ALTERNATE WITH 88MCG 45 tablet 3     SYNTHROID 88 MCG tablet TAKE 1 TABLET BY MOUTH EVERY OTHER DAY ALTERNATE WITH 75 MCG 45 tablet 3     Family History  family history includes Heart Disease in her father; Hypertension in her father and mother; Lipids in her father and mother.     No thyroid cancer in the family.     Social History  Social History     Tobacco Use     Smoking status: Never  Smoker     Smokeless tobacco: Never Used   Substance Use Topics     Alcohol use: Yes     Comment: occasional     Drug use: No   , has 2 children  She works as  in high school    ROS  10 points ROS were negative otherwise mentioned in HPI  Has intermittent anxiety    Physical Exam  Constitutional: no distress, comfortable, pleasant     RESULTS  Surgical pathology: 2007  Papillary thyroid cancer sized 1.9x1.4x1 cm with no extrathyroidal extension. Margins were negative and there was no evidence on the left side.    Outside lab  Lab from [facility]  7/19/2007: TPO1.5 (0-8.9).   9/14/2007: TSH 34.4, Tg 2.8 (0-33), ROXANN <1.8  11/5/2007: TSH 1.46, FT4 1.2  12/27/2007: TSH 0.05  2/25/2008: TSH 0.64  5/19/2008: TSH 0.05, FT4 1.5  8/8/2008: TSH 0.54, FT4 1.3, Tg 0.4, ROXANN <1.8  11/24/2008: TSH 0.05, FT4 1.7, Tg 1.6, ROXANN <1.8  1/29/2009: TSH 0.06, FT4 1.3  6/1/2009; TSH 0.04, FT4 1.3, Tg 0.3, ROXANN<1.8  4/20/2010: TSH 0.02, FT4 1.5, Tg 0.4, ROXANN <1.8  8/9/2010: TSH 0.02, FT4 1.4  10/21/2010: TSH 0.02, FT4 1.4  12/21/2010: TSH 0.12, FT4 1.2  7/5/2011: TSH 0.28, FT4 1.2, Tg 0.6, ROXANN<20  6/25/2012: TSH 0.27, FT4 1.4, Tg 0.9, ROXANN<20  9/12/2013: TSH 0.82, FT4 1.4, Tg 1.1, ROXANN <20  10/28/2013: TSH 0.22, FT4 1.5, Tg 0.8, ROXANN<20    Lab from [facility]  6/25/14: TSH 0.53  9/24/15: TSH 0.22  12/29/15: TSH 1.09    Lab from [facility]  10/3/2016: TSH 0.7, free T4 1.22, thyroglobulin 1.2 and thyroglobulin antibodies less than 0.4.  2/26/2018:  TSH 1.04, free T4 1.11, thyroglobulin 1.7 and thyroglobulin antibodies less than 0.4.  8/8/2018: TSH 1.63, FT4 1.14  6/3/2019: TSH 0.66, FT4 1.27, thyroglobulin 1.5, TgAb <0.4    Imaging from [facility]  9/19/2007: I123 scan -- no focal area of residual uptake within thyroid bed. There was residual uptake within the thyroid bed. 24 hour uptake 2.2%.  9/28/2007: post treatment scan -- focus of increased activity in the thyroid bed consistent with residual thyroid activity  and focus of activity in the left hemipelvis which could like within the ovary.   10/4/2007: CT scan: 4.2 cm low attenuation lesion within the left adnexa likely representing of ovarian origin.   9/17/2008: US thyroid -- 5 mm echogenic area in the left thyroid bed with posterior shadowing. No evidence of residual thyroid tissue   11/26/2008: WBS -- very faint midline neck activity  4/20/2010: US thyroid -- no residual or recurrent mass seen in thyroid bed.  7/5/2011: US thyroid -- negative exam  6/25/2012: US thyroid -- postsurgical changes of thyroidectomy with no sonographic findings of recurrence.      ASSESSMENT:    Addi is a 49 years old female with hx of papillary thyroid cancer who has telephone visit to follow-up papillary thyroid cancer s/p total thyroidectomy and I131 radioactive iodine ablation    1) Papillary thyroid cancer s/p total thyroidectomy and I131 radioactive iodine ablation: diagnosed in 2007. Pathology showed follicular variant of PTC without lymph node metastasis.   Stimulated Tg was low.   Lab on 6/3/2019 showed TSH 0.66, FT4 1.27, thyroglobulin 1.5, TgAb <0.4 Ultrasound neck in 2012 did not show any suspicious finding she is low risk.  Will check TSH, FT4 and Tg today.    2) Postsurgical/ablative hypothyroidism: She has had PTC for long time and was on suppression therapy many years after surgery. I think we do not need to keep on suppressed TSH level given low risk.   Will check TSH and FT4 today.    3) Hypertension: both systolic and diastolic. Did have work up for secondary hypertension in the past which were negative. Currently on metoprolol ER 50 mg daily.  Reports normal home BP.    PLAN:   Check TSH, FT4 and Thyroglobulin today  RTC 1 year    Phone start time: 12:24 pm  Phone end time: 12:31 pm  Phone call duration: 7 minutes    Ifrah Rodrigues MD     Division of Diabetes and Endocrinology  Department of Medicine  [phone]

## 2020-06-08 NOTE — LETTER
"    6/8/2020         RE: Addi Flores  [address] [facility] [address]        Dear Colleague,    Thank you for referring your patient, Addi Flores, to the [facility]. Please see a copy of my visit note below.    Addi Flores is a 49 year old female who is being evaluated via a billable telephone visit.      The patient has been notified of following:     \"This telephone visit will be conducted via a call between you and your physician/provider. We have found that certain health care needs can be provided without the need for a physical exam.  This service lets us provide the care you need with a short phone conversation.  If a prescription is necessary we can send it directly to your pharmacy.  If lab work is needed we can place an order for that and you can then stop by our lab to have the test done at a later time.    Telephone visits are billed at different rates depending on your insurance coverage. During this emergency period, for some insurers they may be billed the same as an in-person visit.  Please reach out to your insurance provider with any questions.    If during the course of the call the physician/provider feels a telephone visit is not appropriate, you will not be charged for this service.\"    Patient has given verbal consent for Telephone visit?  Yes    What phone number would you like to be contacted at? [phone]    How would you like to obtain your AVS? MyChart          Endocrinology Note         Addi is a 49 year old femalewho has telephone visit today for papillary thyroid cancer    HPI  Addi is a 49 years old female with anxiety, hx of papillary thyroid cancer who has telephone visit for follow up follicular variant of papillary thyroid cancer    She was diagnosed with papillary thyroid cancer in 2007 after she noted to have palpable thyroid nodule by her dermatologist. She did have US guided FNA of the nodule that showed " enlarged nuclei in the thyroid parenchyma with focally papillary architecture. There were intranuclear inclusion and the result was suspicious. Given suspicious result, she underwent thyroidectomy which was consistent with right follicular variant of papillary thyroid cancer sized 1.9x1.4x1 cm with no extrathyroidal extension and margins were negative. She received I131 radioactive iodine treatment 100.4 mCi on 9/24/2007.    She did have low stimulated thyroglobulin and negative WBS uptake in 2008.    She has had hypertension particularly when she came to doctor's office. She had full evaluation for secondary hypertension including 24 hours metanephrine, cortisol and VMA which were all negative. Both systolic and diastolic blood pressure have been high. She takes metoprolol 50 mg daily. She told me that her home blood pressures were good. She did have hysterectomy with preserved one ovary due to ovarian cyst around 2008.     Interim history  She has been off over the past few months during COVID outbreak. She has been doing very well since last week.  She is currently on brand name Synthroid 75 mcg daily alternating with 88 mcg. She denies chest pain, shortness of breath, temperature intolerance, altered bowel movement. She has had some insomnia that is related to anxiety. She has not notice any lump in her neck. No difficulty swallowing or breathing.    Labs on 6/3/2019 showed TSH 0.66, FT4 1.27, thyroglobulin 1.5, TgAb <0.4    Past Medical History  Past Medical History:   Diagnosis Date     Thyroid cancer (H) 2007     Allergies  Allergies   Allergen Reactions     Penicillins      Medications  Current Outpatient Medications   Medication Sig Dispense Refill     METOPROLOL SUCCINATE ER PO Take 50 mg by mouth daily       SYNTHROID 75 MCG tablet TAKE 1 TABLET BY MOUTH EVERY OTHER DAY, ALTERNATE WITH 88MCG 45 tablet 3     SYNTHROID 88 MCG tablet TAKE 1 TABLET BY MOUTH EVERY OTHER DAY ALTERNATE WITH 75 MCG 45 tablet 3      Family History  family history includes Heart Disease in her father; Hypertension in her father and mother; Lipids in her father and mother.     No thyroid cancer in the family.     Social History  Social History     Tobacco Use     Smoking status: Never Smoker     Smokeless tobacco: Never Used   Substance Use Topics     Alcohol use: Yes     Comment: occasional     Drug use: No   , has 2 children  She works as  in high school    ROS  10 points ROS were negative otherwise mentioned in HPI  Has intermittent anxiety    Physical Exam  Constitutional: no distress, comfortable, pleasant     RESULTS  Surgical pathology: 2007  Papillary thyroid cancer sized 1.9x1.4x1 cm with no extrathyroidal extension. Margins were negative and there was no evidence on the left side.    Outside lab  Lab from [facility]  7/19/2007: TPO1.5 (0-8.9).   9/14/2007: TSH 34.4, Tg 2.8 (0-33), ROXANN <1.8  11/5/2007: TSH 1.46, FT4 1.2  12/27/2007: TSH 0.05  2/25/2008: TSH 0.64  5/19/2008: TSH 0.05, FT4 1.5  8/8/2008: TSH 0.54, FT4 1.3, Tg 0.4, ROXANN <1.8  11/24/2008: TSH 0.05, FT4 1.7, Tg 1.6, ROAXNN <1.8  1/29/2009: TSH 0.06, FT4 1.3  6/1/2009; TSH 0.04, FT4 1.3, Tg 0.3, ROXANN<1.8  4/20/2010: TSH 0.02, FT4 1.5, Tg 0.4, ROXANN <1.8  8/9/2010: TSH 0.02, FT4 1.4  10/21/2010: TSH 0.02, FT4 1.4  12/21/2010: TSH 0.12, FT4 1.2  7/5/2011: TSH 0.28, FT4 1.2, Tg 0.6, ROXANN<20  6/25/2012: TSH 0.27, FT4 1.4, Tg 0.9, ROXANN<20  9/12/2013: TSH 0.82, FT4 1.4, Tg 1.1, ROXANN <20  10/28/2013: TSH 0.22, FT4 1.5, Tg 0.8, ROXANN<20    Lab from [facility]  6/25/14: TSH 0.53  9/24/15: TSH 0.22  12/29/15: TSH 1.09    Lab from [facility]  10/3/2016: TSH 0.7, free T4 1.22, thyroglobulin 1.2 and thyroglobulin antibodies less than 0.4.  2/26/2018:  TSH 1.04, free T4 1.11, thyroglobulin 1.7 and thyroglobulin antibodies less than 0.4.  8/8/2018: TSH 1.63, FT4 1.14  6/3/2019: TSH 0.66, FT4 1.27, thyroglobulin 1.5, TgAb <0.4    Imaging from [facility]  9/19/2007:  I123 scan -- no focal area of residual uptake within thyroid bed. There was residual uptake within the thyroid bed. 24 hour uptake 2.2%.  9/28/2007: post treatment scan -- focus of increased activity in the thyroid bed consistent with residual thyroid activity and focus of activity in the left hemipelvis which could like within the ovary.   10/4/2007: CT scan: 4.2 cm low attenuation lesion within the left adnexa likely representing of ovarian origin.   9/17/2008: US thyroid -- 5 mm echogenic area in the left thyroid bed with posterior shadowing. No evidence of residual thyroid tissue   11/26/2008: WBS -- very faint midline neck activity  4/20/2010: US thyroid -- no residual or recurrent mass seen in thyroid bed.  7/5/2011: US thyroid -- negative exam  6/25/2012: US thyroid -- postsurgical changes of thyroidectomy with no sonographic findings of recurrence.      ASSESSMENT:    Addi is a 49 years old female with hx of papillary thyroid cancer who has telephone visit to follow-up papillary thyroid cancer s/p total thyroidectomy and I131 radioactive iodine ablation    1) Papillary thyroid cancer s/p total thyroidectomy and I131 radioactive iodine ablation: diagnosed in 2007. Pathology showed follicular variant of PTC without lymph node metastasis.   Stimulated Tg was low.   Lab on 6/3/2019 showed TSH 0.66, FT4 1.27, thyroglobulin 1.5, TgAb <0.4 Ultrasound neck in 2012 did not show any suspicious finding she is low risk.  Will check TSH, FT4 and Tg today.    2) Postsurgical/ablative hypothyroidism: She has had PTC for long time and was on suppression therapy many years after surgery. I think we do not need to keep on suppressed TSH level given low risk.   Will check TSH and FT4 today.    3) Hypertension: both systolic and diastolic. Did have work up for secondary hypertension in the past which were negative. Currently on metoprolol ER 50 mg daily.  Reports normal home BP.    PLAN:   Check TSH, FT4 and Thyroglobulin  today  RTC 1 year    Phone start time: 12:24 pm  Phone end time: 12:31 pm  Phone call duration: 7 minutes    Ifrah Rodrigues MD     Division of Diabetes and Endocrinology  Department of Medicine  [phone]

## 2020-06-11 DIAGNOSIS — C73 PAPILLARY THYROID CARCINOMA (H): ICD-10-CM

## 2020-06-11 DIAGNOSIS — E89.0 POSTOPERATIVE HYPOTHYROIDISM: ICD-10-CM

## 2020-06-11 LAB
T4 FREE SERPL-MCNC: 1.32 NG/DL (ref 0.76–1.46)
TSH SERPL DL<=0.005 MIU/L-ACNC: 0.41 MU/L (ref 0.4–4)

## 2020-06-11 PROCEDURE — 84432 ASSAY OF THYROGLOBULIN: CPT | Mod: 90 | Performed by: INTERNAL MEDICINE

## 2020-06-11 PROCEDURE — 99000 SPECIMEN HANDLING OFFICE-LAB: CPT | Performed by: INTERNAL MEDICINE

## 2020-06-11 PROCEDURE — 84439 ASSAY OF FREE THYROXINE: CPT | Performed by: INTERNAL MEDICINE

## 2020-06-11 PROCEDURE — 86800 THYROGLOBULIN ANTIBODY: CPT | Mod: 90 | Performed by: INTERNAL MEDICINE

## 2020-06-11 PROCEDURE — 00000344 ZZHCL STATISTIC REMEASURE THYROGLOBULIN: Mod: 90 | Performed by: INTERNAL MEDICINE

## 2020-06-11 PROCEDURE — 36415 COLL VENOUS BLD VENIPUNCTURE: CPT | Performed by: INTERNAL MEDICINE

## 2020-06-11 PROCEDURE — 84443 ASSAY THYROID STIM HORMONE: CPT | Performed by: INTERNAL MEDICINE

## 2020-06-19 LAB — LAB SCANNED RESULT: NORMAL

## 2020-06-22 DIAGNOSIS — C73 PAPILLARY THYROID CARCINOMA (H): ICD-10-CM

## 2020-06-22 DIAGNOSIS — E89.0 POSTOPERATIVE HYPOTHYROIDISM: ICD-10-CM

## 2020-06-23 RX ORDER — LEVOTHYROXINE SODIUM 88 MCG
88 TABLET ORAL EVERY OTHER DAY
Qty: 45 TABLET | Refills: 3 | Status: SHIPPED | OUTPATIENT
Start: 2020-06-23 | End: 2021-06-07

## 2020-06-23 RX ORDER — LEVOTHYROXINE SODIUM 75 MCG
75 TABLET ORAL EVERY OTHER DAY
Qty: 45 TABLET | Refills: 3 | Status: SHIPPED | OUTPATIENT
Start: 2020-06-23 | End: 2021-06-07

## 2020-12-06 ENCOUNTER — HEALTH MAINTENANCE LETTER (OUTPATIENT)
Age: 50
End: 2020-12-06

## 2021-02-20 ENCOUNTER — HEALTH MAINTENANCE LETTER (OUTPATIENT)
Age: 51
End: 2021-02-20

## 2021-04-11 ENCOUNTER — HEALTH MAINTENANCE LETTER (OUTPATIENT)
Age: 51
End: 2021-04-11

## 2021-06-07 ENCOUNTER — OFFICE VISIT (OUTPATIENT)
Dept: ENDOCRINOLOGY | Facility: CLINIC | Age: 51
End: 2021-06-07
Payer: COMMERCIAL

## 2021-06-07 VITALS
BODY MASS INDEX: 18.93 KG/M2 | DIASTOLIC BLOOD PRESSURE: 111 MMHG | SYSTOLIC BLOOD PRESSURE: 181 MMHG | OXYGEN SATURATION: 99 % | HEART RATE: 92 BPM | WEIGHT: 103.4 LBS

## 2021-06-07 DIAGNOSIS — C73 PAPILLARY THYROID CARCINOMA (H): ICD-10-CM

## 2021-06-07 DIAGNOSIS — I10 ESSENTIAL HYPERTENSION: Primary | ICD-10-CM

## 2021-06-07 DIAGNOSIS — E89.0 POSTOPERATIVE HYPOTHYROIDISM: ICD-10-CM

## 2021-06-07 LAB
T4 FREE SERPL-MCNC: 1.3 NG/DL (ref 0.76–1.46)
TSH SERPL DL<=0.005 MIU/L-ACNC: 0.45 MU/L (ref 0.4–4)

## 2021-06-07 PROCEDURE — 84443 ASSAY THYROID STIM HORMONE: CPT | Performed by: INTERNAL MEDICINE

## 2021-06-07 PROCEDURE — 83835 ASSAY OF METANEPHRINES: CPT | Mod: 90 | Performed by: INTERNAL MEDICINE

## 2021-06-07 PROCEDURE — 84439 ASSAY OF FREE THYROXINE: CPT | Performed by: INTERNAL MEDICINE

## 2021-06-07 PROCEDURE — 99215 OFFICE O/P EST HI 40 MIN: CPT | Performed by: INTERNAL MEDICINE

## 2021-06-07 PROCEDURE — 36415 COLL VENOUS BLD VENIPUNCTURE: CPT | Performed by: INTERNAL MEDICINE

## 2021-06-07 PROCEDURE — 82088 ASSAY OF ALDOSTERONE: CPT | Performed by: INTERNAL MEDICINE

## 2021-06-07 PROCEDURE — 84432 ASSAY OF THYROGLOBULIN: CPT | Mod: 90 | Performed by: INTERNAL MEDICINE

## 2021-06-07 PROCEDURE — 99000 SPECIMEN HANDLING OFFICE-LAB: CPT | Performed by: INTERNAL MEDICINE

## 2021-06-07 PROCEDURE — 86800 THYROGLOBULIN ANTIBODY: CPT | Mod: 90 | Performed by: INTERNAL MEDICINE

## 2021-06-07 PROCEDURE — 84244 ASSAY OF RENIN: CPT | Mod: 90 | Performed by: INTERNAL MEDICINE

## 2021-06-07 RX ORDER — LEVOTHYROXINE SODIUM 88 MCG
88 TABLET ORAL EVERY OTHER DAY
Qty: 45 TABLET | Refills: 3 | Status: SHIPPED | OUTPATIENT
Start: 2021-06-07 | End: 2022-04-29

## 2021-06-07 RX ORDER — LEVOTHYROXINE SODIUM 75 MCG
75 TABLET ORAL EVERY OTHER DAY
Qty: 45 TABLET | Refills: 3 | Status: SHIPPED | OUTPATIENT
Start: 2021-06-07 | End: 2022-06-23

## 2021-06-07 NOTE — PROGRESS NOTES
Endocrinology Note         Addi is a 50 year old femalewho has a visit today for papillary thyroid cancer    HPI  Addi is a 50 years old female with anxiety, hx of papillary thyroid cancer who has a visit for follow up follicular variant of papillary thyroid cancer    She was diagnosed with papillary thyroid cancer in 2007 after she noted to have palpable thyroid nodule by her dermatologist. She did have US guided FNA of the nodule that showed enlarged nuclei in the thyroid parenchyma with focally papillary architecture. There were intranuclear inclusion and the result was suspicious. Given suspicious result, she underwent thyroidectomy which was consistent with right follicular variant of papillary thyroid cancer sized 1.9x1.4x1 cm with no extrathyroidal extension and margins were negative. She received I131 radioactive iodine treatment 100.4 mCi on 9/24/2007.    She did have low stimulated thyroglobulin and negative WBS uptake in 2008.    She has had hypertension particularly when she came to doctor's office. She had full evaluation for secondary hypertension including 24 hours metanephrine, cortisol and VMA which were all negative. Both systolic and diastolic blood pressure have been high. She takes metoprolol 50 mg daily. She told me that her home blood pressures were good. She did have hysterectomy with preserved one ovary due to ovarian cyst around 2008.     Interim history  She has been feeling ok for the past year. She continues to have some anxiety. She saw her PCP for hypertension and was started on amlodipine 2.5 mg daily. However, she was afraid of side effects so she has not started it yet.     She is currently on brand name Synthroid 75 mcg daily alternating with 88 mcg. She denies chest pain, shortness of breath, temperature intolerance, altered bowel movement. She has had some insomnia that is related to anxiety. She has not notice any lump in her neck. No difficulty swallowing or  breathing.    Labs on 6/2020 showed TSH 0.41, FT4 1.32, thyroglobulin 1.3, TgAb <0.4    Past Medical History  Past Medical History:   Diagnosis Date     Thyroid cancer (H) 2007     Allergies  Allergies   Allergen Reactions     Penicillins      Medications  Current Outpatient Medications   Medication Sig Dispense Refill     METOPROLOL SUCCINATE ER PO Take 50 mg by mouth daily       SYNTHROID 75 MCG tablet Take 1 tablet (75 mcg) by mouth every other day *alternate with 88 mcg tablet. 45 tablet 3     SYNTHROID 88 MCG tablet Take 1 tablet (88 mcg) by mouth every other day *alternate with 75 mcg tablet. 45 tablet 3     Family History  family history includes Heart Disease in her father; Hypertension in her father and mother; Lipids in her father and mother.     No thyroid cancer in the family.     Social History  Social History     Tobacco Use     Smoking status: Never Smoker     Smokeless tobacco: Never Used   Substance Use Topics     Alcohol use: Yes     Comment: occasional     Drug use: No   , has 2 children  She works as  in high school    ROS  10 points ROS were negative otherwise mentioned in HPI  Has intermittent anxiety    Physical Exam  Constitutional: no distress, comfortable, pleasant     RESULTS  Surgical pathology: 2007  Papillary thyroid cancer sized 1.9x1.4x1 cm with no extrathyroidal extension. Margins were negative and there was no evidence on the left side.    Outside lab  Lab from [facility]  7/19/2007: TPO1.5 (0-8.9).   9/14/2007: TSH 34.4, Tg 2.8 (0-33), ROXANN <1.8  11/5/2007: TSH 1.46, FT4 1.2  12/27/2007: TSH 0.05  2/25/2008: TSH 0.64  5/19/2008: TSH 0.05, FT4 1.5  8/8/2008: TSH 0.54, FT4 1.3, Tg 0.4, ROXANN <1.8  11/24/2008: TSH 0.05, FT4 1.7, Tg 1.6, ROXANN <1.8  1/29/2009: TSH 0.06, FT4 1.3  6/1/2009; TSH 0.04, FT4 1.3, Tg 0.3, ROXANN<1.8  4/20/2010: TSH 0.02, FT4 1.5, Tg 0.4, ROXANN <1.8  8/9/2010: TSH 0.02, FT4 1.4  10/21/2010: TSH 0.02, FT4 1.4  12/21/2010: TSH 0.12, FT4  1.2  7/5/2011: TSH 0.28, FT4 1.2, Tg 0.6, ROXNAN<20  6/25/2012: TSH 0.27, FT4 1.4, Tg 0.9, ROXANN<20  9/12/2013: TSH 0.82, FT4 1.4, Tg 1.1, ROXANN <20  10/28/2013: TSH 0.22, FT4 1.5, Tg 0.8, ROXANN<20    Lab from [facility]  6/25/14: TSH 0.53  9/24/15: TSH 0.22  12/29/15: TSH 1.09    Lab from [facility]  10/3/2016: TSH 0.7, free T4 1.22, thyroglobulin 1.2 and thyroglobulin antibodies less than 0.4.  2/26/2018:  TSH 1.04, free T4 1.11, thyroglobulin 1.7 and thyroglobulin antibodies less than 0.4.  8/8/2018: TSH 1.63, FT4 1.14  6/3/2019: TSH 0.66, FT4 1.27, thyroglobulin 1.5, TgAb <0.4    Imaging from [facility]  9/19/2007: I123 scan -- no focal area of residual uptake within thyroid bed. There was residual uptake within the thyroid bed. 24 hour uptake 2.2%.  9/28/2007: post treatment scan -- focus of increased activity in the thyroid bed consistent with residual thyroid activity and focus of activity in the left hemipelvis which could like within the ovary.   10/4/2007: CT scan: 4.2 cm low attenuation lesion within the left adnexa likely representing of ovarian origin.   9/17/2008: US thyroid -- 5 mm echogenic area in the left thyroid bed with posterior shadowing. No evidence of residual thyroid tissue   11/26/2008: WBS -- very faint midline neck activity  4/20/2010: US thyroid -- no residual or recurrent mass seen in thyroid bed.  7/5/2011: US thyroid -- negative exam  6/25/2012: US thyroid -- postsurgical changes of thyroidectomy with no sonographic findings of recurrence.      ASSESSMENT:    Addi is a 50 years old female with hx of papillary thyroid cancer who has telephone visit to follow-up papillary thyroid cancer s/p total thyroidectomy and I131 radioactive iodine ablation    1) Papillary thyroid cancer s/p total thyroidectomy and I131 radioactive iodine ablation: diagnosed in 2007. Pathology showed follicular variant of PTC without lymph node metastasis. Ultrasound neck in 2012 did not show any suspicious finding  she is low risk.  Stimulated Tg was low.   Lab on 6/2020 showed TSH 0.41, FT4 1.32, thyroglobulin 1.3, TgAb <0.4.     Will check TSH, FT4 and Tg today.    2) Postsurgical/ablative hypothyroidism: She has had PTC for long time and was on suppression therapy many years after surgery. I think we do not need to keep on suppressed TSH level given low risk.   Will check TSH and FT4 today.    3) Hypertension: both systolic and diastolic. Did have work up for secondary hypertension in the past which were negative. Currently on metoprolol ER 50 mg daily.    Will check metanephrine and aldosterone/renin again this year  Advise her to discuss with PCP and consider adding antihypertensive medication    PLAN:   Check TSH, FT4 and Thyroglobulin, metanephrine and aldosterone/renin today  RTC 1 year    External notes/medical records independently reviewed, labs and imaging independently reviewed, medical management and tests to be discussed/communicated to patient.    Time: I spent 40 minutes spent on the date of the encounter preparing to see patient (including chart review and preparation), obtaining and or reviewing additional medical history, performing a physical exam and evaluation, documenting clinical information in the electronic health record, independently interpreting results, communicating results to the patient and coordinating care.    Ifrah Rodrigues MD     Division of Diabetes and Endocrinology  Department of Medicine  [phone]

## 2021-06-07 NOTE — LETTER
6/7/2021         RE: Addi Flores  [address] [facility] [address]        Dear Colleague,    Thank you for referring your patient, Addi Flores, to the [facility]. Please see a copy of my visit note below.    Endocrinology Note         Addi is a 50 year old femalewho has a visit today for papillary thyroid cancer    HPI  Addi is a 50 years old female with anxiety, hx of papillary thyroid cancer who has a visit for follow up follicular variant of papillary thyroid cancer    She was diagnosed with papillary thyroid cancer in 2007 after she noted to have palpable thyroid nodule by her dermatologist. She did have US guided FNA of the nodule that showed enlarged nuclei in the thyroid parenchyma with focally papillary architecture. There were intranuclear inclusion and the result was suspicious. Given suspicious result, she underwent thyroidectomy which was consistent with right follicular variant of papillary thyroid cancer sized 1.9x1.4x1 cm with no extrathyroidal extension and margins were negative. She received I131 radioactive iodine treatment 100.4 mCi on 9/24/2007.    She did have low stimulated thyroglobulin and negative WBS uptake in 2008.    She has had hypertension particularly when she came to doctor's office. She had full evaluation for secondary hypertension including 24 hours metanephrine, cortisol and VMA which were all negative. Both systolic and diastolic blood pressure have been high. She takes metoprolol 50 mg daily. She told me that her home blood pressures were good. She did have hysterectomy with preserved one ovary due to ovarian cyst around 2008.     Interim history  She has been feeling ok for the past year. She continues to have some anxiety. She saw her PCP for hypertension and was started on amlodipine 2.5 mg daily. However, she was afraid of side effects so she has not started it yet.     She is currently on brand name Synthroid  75 mcg daily alternating with 88 mcg. She denies chest pain, shortness of breath, temperature intolerance, altered bowel movement. She has had some insomnia that is related to anxiety. She has not notice any lump in her neck. No difficulty swallowing or breathing.    Labs on 6/2020 showed TSH 0.41, FT4 1.32, thyroglobulin 1.3, TgAb <0.4    Past Medical History  Past Medical History:   Diagnosis Date     Thyroid cancer (H) 2007     Allergies  Allergies   Allergen Reactions     Penicillins      Medications  Current Outpatient Medications   Medication Sig Dispense Refill     METOPROLOL SUCCINATE ER PO Take 50 mg by mouth daily       SYNTHROID 75 MCG tablet Take 1 tablet (75 mcg) by mouth every other day *alternate with 88 mcg tablet. 45 tablet 3     SYNTHROID 88 MCG tablet Take 1 tablet (88 mcg) by mouth every other day *alternate with 75 mcg tablet. 45 tablet 3     Family History  family history includes Heart Disease in her father; Hypertension in her father and mother; Lipids in her father and mother.     No thyroid cancer in the family.     Social History  Social History     Tobacco Use     Smoking status: Never Smoker     Smokeless tobacco: Never Used   Substance Use Topics     Alcohol use: Yes     Comment: occasional     Drug use: No   , has 2 children  She works as  in high school    ROS  10 points ROS were negative otherwise mentioned in HPI  Has intermittent anxiety    Physical Exam  Constitutional: no distress, comfortable, pleasant     RESULTS  Surgical pathology: 2007  Papillary thyroid cancer sized 1.9x1.4x1 cm with no extrathyroidal extension. Margins were negative and there was no evidence on the left side.    Outside lab  Lab from [facility]  7/19/2007: TPO1.5 (0-8.9).   9/14/2007: TSH 34.4, Tg 2.8 (0-33), ROXANN <1.8  11/5/2007: TSH 1.46, FT4 1.2  12/27/2007: TSH 0.05  2/25/2008: TSH 0.64  5/19/2008: TSH 0.05, FT4 1.5  8/8/2008: TSH 0.54, FT4 1.3, Tg 0.4, ROXANN <1.8  11/24/2008:  TSH 0.05, FT4 1.7, Tg 1.6, ROXANN <1.8  1/29/2009: TSH 0.06, FT4 1.3  6/1/2009; TSH 0.04, FT4 1.3, Tg 0.3, ROXANN<1.8  4/20/2010: TSH 0.02, FT4 1.5, Tg 0.4, ROXANN <1.8  8/9/2010: TSH 0.02, FT4 1.4  10/21/2010: TSH 0.02, FT4 1.4  12/21/2010: TSH 0.12, FT4 1.2  7/5/2011: TSH 0.28, FT4 1.2, Tg 0.6, ROXANN<20  6/25/2012: TSH 0.27, FT4 1.4, Tg 0.9, ROXANN<20  9/12/2013: TSH 0.82, FT4 1.4, Tg 1.1, ROXANN <20  10/28/2013: TSH 0.22, FT4 1.5, Tg 0.8, ROXANN<20    Lab from [facility]  6/25/14: TSH 0.53  9/24/15: TSH 0.22  12/29/15: TSH 1.09    Lab from [facility]  10/3/2016: TSH 0.7, free T4 1.22, thyroglobulin 1.2 and thyroglobulin antibodies less than 0.4.  2/26/2018:  TSH 1.04, free T4 1.11, thyroglobulin 1.7 and thyroglobulin antibodies less than 0.4.  8/8/2018: TSH 1.63, FT4 1.14  6/3/2019: TSH 0.66, FT4 1.27, thyroglobulin 1.5, TgAb <0.4    Imaging from [facility]  9/19/2007: I123 scan -- no focal area of residual uptake within thyroid bed. There was residual uptake within the thyroid bed. 24 hour uptake 2.2%.  9/28/2007: post treatment scan -- focus of increased activity in the thyroid bed consistent with residual thyroid activity and focus of activity in the left hemipelvis which could like within the ovary.   10/4/2007: CT scan: 4.2 cm low attenuation lesion within the left adnexa likely representing of ovarian origin.   9/17/2008: US thyroid -- 5 mm echogenic area in the left thyroid bed with posterior shadowing. No evidence of residual thyroid tissue   11/26/2008: WBS -- very faint midline neck activity  4/20/2010: US thyroid -- no residual or recurrent mass seen in thyroid bed.  7/5/2011: US thyroid -- negative exam  6/25/2012: US thyroid -- postsurgical changes of thyroidectomy with no sonographic findings of recurrence.      ASSESSMENT:    Addi is a 50 years old female with hx of papillary thyroid cancer who has telephone visit to follow-up papillary thyroid cancer s/p total thyroidectomy and I131 radioactive iodine  ablation    1) Papillary thyroid cancer s/p total thyroidectomy and I131 radioactive iodine ablation: diagnosed in 2007. Pathology showed follicular variant of PTC without lymph node metastasis. Ultrasound neck in 2012 did not show any suspicious finding she is low risk.  Stimulated Tg was low.   Lab on 6/2020 showed TSH 0.41, FT4 1.32, thyroglobulin 1.3, TgAb <0.4.     Will check TSH, FT4 and Tg today.    2) Postsurgical/ablative hypothyroidism: She has had PTC for long time and was on suppression therapy many years after surgery. I think we do not need to keep on suppressed TSH level given low risk.   Will check TSH and FT4 today.    3) Hypertension: both systolic and diastolic. Did have work up for secondary hypertension in the past which were negative. Currently on metoprolol ER 50 mg daily.    Will check metanephrine and aldosterone/renin again this year  Advise her to discuss with PCP and consider adding antihypertensive medication    PLAN:   Check TSH, FT4 and Thyroglobulin, metanephrine and aldosterone/renin today  RTC 1 year    External notes/medical records independently reviewed, labs and imaging independently reviewed, medical management and tests to be discussed/communicated to patient.    Time: I spent 40 minutes spent on the date of the encounter preparing to see patient (including chart review and preparation), obtaining and or reviewing additional medical history, performing a physical exam and evaluation, documenting clinical information in the electronic health record, independently interpreting results, communicating results to the patient and coordinating care.    Ifrah Rodrigues MD     Division of Diabetes and Endocrinology  Department of Medicine  [phone]      Again, thank you for allowing me to participate in the care of your patient.        Sincerely,        Ifrah Rodrigues MD

## 2021-06-07 NOTE — NURSING NOTE
Addi Flores's goals for this visit include:   Chief Complaint   Patient presents with     Thyroid Disease     She requests these members of her care team be copied on today's visit information: Yes    PCP: Nichelle Melo    Referring Provider:  Nichelle Melo MD  [address] [address]    BP (!) 197/131 (BP Location: Left arm, Patient Position: Sitting, Cuff Size: Adult Regular)   Pulse 92   Wt 46.9 kg (103 lb 6.4 oz)   LMP 06/21/2008   SpO2 99%   BMI 18.93 kg/m      Do you need any medication refills at today's visit? Yes

## 2021-06-11 LAB
ALDOST SERPL-MCNC: 9.7 NG/DL (ref 0–31)
ANNOTATION COMMENT IMP: ABNORMAL
METANEPHS SERPL-SCNC: 0.69 NMOL/L (ref 0–0.49)
NORMETANEPHRINE SERPL-SCNC: 0.44 NMOL/L (ref 0–0.89)

## 2021-06-14 LAB
LAB SCANNED RESULT: NORMAL
RENIN PLAS-CCNC: 0.4 NG/ML/HR

## 2021-09-25 ENCOUNTER — HEALTH MAINTENANCE LETTER (OUTPATIENT)
Age: 51
End: 2021-09-25

## 2022-03-12 ENCOUNTER — HEALTH MAINTENANCE LETTER (OUTPATIENT)
Age: 52
End: 2022-03-12

## 2022-04-29 DIAGNOSIS — E89.0 POSTOPERATIVE HYPOTHYROIDISM: ICD-10-CM

## 2022-04-29 DIAGNOSIS — C73 PAPILLARY THYROID CARCINOMA (H): Primary | ICD-10-CM

## 2022-05-07 ENCOUNTER — HEALTH MAINTENANCE LETTER (OUTPATIENT)
Age: 52
End: 2022-05-07

## 2022-06-13 ENCOUNTER — OFFICE VISIT (OUTPATIENT)
Dept: ENDOCRINOLOGY | Facility: CLINIC | Age: 52
End: 2022-06-13
Payer: COMMERCIAL

## 2022-06-13 ENCOUNTER — ANCILLARY PROCEDURE (OUTPATIENT)
Dept: ULTRASOUND IMAGING | Facility: CLINIC | Age: 52
End: 2022-06-13
Attending: INTERNAL MEDICINE
Payer: COMMERCIAL

## 2022-06-13 VITALS
WEIGHT: 103.7 LBS | SYSTOLIC BLOOD PRESSURE: 190 MMHG | HEART RATE: 100 BPM | BODY MASS INDEX: 18.99 KG/M2 | DIASTOLIC BLOOD PRESSURE: 125 MMHG | OXYGEN SATURATION: 99 %

## 2022-06-13 DIAGNOSIS — C73 PAPILLARY THYROID CARCINOMA (H): ICD-10-CM

## 2022-06-13 DIAGNOSIS — E89.0 POSTOPERATIVE HYPOTHYROIDISM: ICD-10-CM

## 2022-06-13 LAB
T4 FREE SERPL-MCNC: 1.22 NG/DL (ref 0.76–1.46)
TSH SERPL DL<=0.005 MIU/L-ACNC: 0.14 MU/L (ref 0.4–4)

## 2022-06-13 PROCEDURE — 76536 US EXAM OF HEAD AND NECK: CPT

## 2022-06-13 PROCEDURE — 84439 ASSAY OF FREE THYROXINE: CPT | Performed by: INTERNAL MEDICINE

## 2022-06-13 PROCEDURE — 84480 ASSAY TRIIODOTHYRONINE (T3): CPT | Performed by: INTERNAL MEDICINE

## 2022-06-13 PROCEDURE — 99000 SPECIMEN HANDLING OFFICE-LAB: CPT | Performed by: INTERNAL MEDICINE

## 2022-06-13 PROCEDURE — 36415 COLL VENOUS BLD VENIPUNCTURE: CPT | Performed by: INTERNAL MEDICINE

## 2022-06-13 PROCEDURE — 86800 THYROGLOBULIN ANTIBODY: CPT | Mod: 90 | Performed by: INTERNAL MEDICINE

## 2022-06-13 PROCEDURE — 84443 ASSAY THYROID STIM HORMONE: CPT | Performed by: INTERNAL MEDICINE

## 2022-06-13 PROCEDURE — 99215 OFFICE O/P EST HI 40 MIN: CPT | Performed by: INTERNAL MEDICINE

## 2022-06-13 PROCEDURE — 84432 ASSAY OF THYROGLOBULIN: CPT | Mod: 90 | Performed by: INTERNAL MEDICINE

## 2022-06-13 RX ORDER — LEVOTHYROXINE SODIUM 75 MCG
75 TABLET ORAL EVERY OTHER DAY
Qty: 45 TABLET | Refills: 3 | Status: CANCELLED | OUTPATIENT
Start: 2022-06-13

## 2022-06-13 NOTE — LETTER
6/13/2022         RE: Addi Flores  [address] [facility] [id number]        Dear Colleague,    Thank you for referring your patient, Addi Flores, to the [facility]. Please see a copy of my visit note below.    Endocrinology Note         Addi is a 51 year old femalewho has a visit today for papillary thyroid cancer    HPI  Addi is a 51 years old female with anxiety, hx of papillary thyroid cancer who has a visit for follow up follicular variant of papillary thyroid cancer    She was diagnosed with papillary thyroid cancer in 2007 after she noted to have palpable thyroid nodule by her dermatologist. She did have US guided FNA of the nodule that showed enlarged nuclei in the thyroid parenchyma with focally papillary architecture. There were intranuclear inclusion and the result was suspicious. Given suspicious result, she underwent thyroidectomy which was consistent with right follicular variant of papillary thyroid cancer sized 1.9x1.4x1 cm with no extrathyroidal extension and margins were negative. She received I131 radioactive iodine treatment 100.4 mCi on 9/24/2007.    She did have low stimulated thyroglobulin and negative WBS uptake in 2008.    She has had hypertension particularly when she came to doctor's office. She had full evaluation for secondary hypertension including 24 hours metanephrine, cortisol and VMA which were all negative. Both systolic and diastolic blood pressure have been high. She takes metoprolol 50 mg daily. She told me that her home blood pressures were good. She did have hysterectomy with preserved one ovary due to ovarian cyst around 2008.     Interim history  She continues to have anxiety. She could not sleep well. She is not currently taking medication for anxiety.     Her TFT was abnormal with TSH of 0.127, FT4 1.99 on 4/11/2022. Synthroid dose was adjusted to 75 mcg daily (from 75 mcg daily alternating with 88 mcg). She  denies chest pain, shortness of breath, temperature intolerance, altered bowel movement. She has not notice any lump in her neck. No difficulty swallowing or breathing.    Past Medical History  Past Medical History:   Diagnosis Date     Thyroid cancer (H) 2007     Allergies  Allergies   Allergen Reactions     Penicillins      Medications  Current Outpatient Medications   Medication Sig Dispense Refill     METOPROLOL SUCCINATE ER PO Take 50 mg by mouth daily       SYNTHROID 75 MCG tablet Take 1 tablet (75 mcg) by mouth every other day *alternate with 88 mcg tablet. 45 tablet 3     Family History  family history includes Heart Disease in her father; Hypertension in her father and mother; Lipids in her father and mother.     No thyroid cancer in the family.     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 Margins were negative and there was no evidence on the left side.    Outside lab  Lab from [facility]  7/19/2007: TPO1.5 (0-8.9).   9/14/2007: TSH 34.4, Tg 2.8 (0-33), ROXANN <1.8  11/5/2007: TSH 1.46, FT4 1.2  12/27/2007: TSH 0.05  2/25/2008: TSH 0.64  5/19/2008: TSH 0.05, FT4 1.5  8/8/2008: TSH 0.54, FT4 1.3, Tg 0.4, ROXANN <1.8  11/24/2008: TSH 0.05, FT4 1.7, Tg 1.6, ROXANN <1.8  1/29/2009: TSH 0.06, FT4 1.3  6/1/2009; TSH 0.04, FT4 1.3, Tg 0.3, ROXANN<1.8  4/20/2010: TSH 0.02, FT4 1.5, Tg 0.4, ROXANN <1.8  8/9/2010: TSH 0.02, FT4 1.4  10/21/2010: TSH 0.02, FT4 1.4  12/21/2010: TSH 0.12, FT4 1.2  7/5/2011: TSH 0.28, FT4 1.2, Tg 0.6, ROXANN<20  6/25/2012:  TSH 0.27, FT4 1.4, Tg 0.9, ROXANN<20  9/12/2013: TSH 0.82, FT4 1.4, Tg 1.1, ROXANN <20  10/28/2013: TSH 0.22, FT4 1.5, Tg 0.8, ROXANN<20    Lab from [facility]  6/25/14: TSH 0.53  9/24/15: TSH 0.22  12/29/15: TSH 1.09    Lab from [facility]  10/3/2016: TSH 0.7, free T4 1.22, thyroglobulin 1.2 and thyroglobulin antibodies less than 0.4.  2/26/2018:  TSH 1.04, free T4 1.11, thyroglobulin 1.7 and thyroglobulin antibodies less than 0.4.  8/8/2018: TSH 1.63, FT4 1.14  6/3/2019: TSH 0.66, FT4 1.27, thyroglobulin 1.5, TgAb <0.4  6/7/2021; TSH 0.45, FT4 1.3, thyroglobulin 1.3, TgAb<0.4    Imaging from [facility]  9/19/2007: I123 scan -- no focal area of residual uptake within thyroid bed. There was residual uptake within the thyroid bed. 24 hour uptake 2.2%.  9/28/2007: post treatment scan -- focus of increased activity in the thyroid bed consistent with residual thyroid activity and focus of activity in the left hemipelvis which could like within the ovary.   10/4/2007: CT scan: 4.2 cm low attenuation lesion within the left adnexa likely representing of ovarian origin.   9/17/2008: US thyroid -- 5 mm echogenic area in the left thyroid bed with posterior shadowing. No evidence of residual thyroid tissue   11/26/2008: WBS -- very faint midline neck activity  4/20/2010: US thyroid -- no residual or recurrent mass seen in thyroid bed.  7/5/2011: US thyroid -- negative exam  6/25/2012: US thyroid -- postsurgical changes of thyroidectomy with no sonographic findings of recurrence.      ASSESSMENT:    Addi is a 51 years old female with hx of papillary thyroid cancer who has telephone visit to follow-up papillary thyroid cancer s/p total thyroidectomy and I131 radioactive iodine ablation    1) Papillary thyroid cancer s/p total thyroidectomy and I131 radioactive iodine ablation: diagnosed in 2007. Pathology showed follicular variant of PTC without lymph node metastasis. Ultrasound neck in 2012 did not show any suspicious finding she  is low risk.  Stimulated Tg was low.     Her TFT was abnormal with TSH of 0.127, FT4 1.99 on 4/11/2022. Synthroid dose was adjusted to 75 mcg daily (from 75 mcg daily alternating with 88 mcg).    Will check TSH, FT4 and Tg today.    2) Postsurgical/ablative hypothyroidism: She has had PTC for long time and was on suppression therapy many years after surgery. I think we do not need to keep on suppressed TSH level given low risk.   Will check TSH and FT4 today.    3) Hypertension: both systolic and diastolic. Did have work up for secondary hypertension in the past which were negative. Currently on metoprolol ER 50 mg daily.    Advise her to discuss with PCP     4) Anxiety: seem to get worse during pandemic. +insomnia. Recommend her to discuss with PCP.    PLAN:   Check TSH, FT4, TT3 and Thyroglobulin and Ab  Schedule ultrasound neck  RTC 1 year    External notes/medical records independently reviewed, labs and imaging independently reviewed, medical management and tests to be discussed/communicated to patient.    Time: I spent 56 minutes spent on the date of the encounter preparing to see patient (including chart review and preparation), obtaining and or reviewing additional medical history, performing a physical exam and evaluation, documenting clinical information in the electronic health record, independently interpreting results, communicating results to the patient and coordinating care.    Ifrah Rodrigues MD, MS  Division of Diabetes and Endocrinology  Department of Medicine

## 2022-06-13 NOTE — NURSING NOTE
Addi Flores's goals for this visit include:   Chief Complaint   Patient presents with     Thyroid Disease     She requests these members of her care team be copied on today's visit information: Yes    PCP: Nichelle Melo    Referring Provider:  Nichelle Melo MD  [address] [address]    BP (!) 190/125 (BP Location: Left arm, Patient Position: Sitting, Cuff Size: Adult Regular)   Pulse 100   Wt 47 kg (103 lb 11.2 oz)   LMP 06/21/2008   SpO2 99%   BMI 18.99 kg/m      Do you need any medication refills at today's visit? Yes

## 2022-06-13 NOTE — PROGRESS NOTES
Endocrinology Note         Addi is a 51 year old femalewho has a visit today for papillary thyroid cancer    HPI  Addi is a 51 years old female with anxiety, hx of papillary thyroid cancer who has a visit for follow up follicular variant of papillary thyroid cancer    She was diagnosed with papillary thyroid cancer in 2007 after she noted to have palpable thyroid nodule by her dermatologist. She did have US guided FNA of the nodule that showed enlarged nuclei in the thyroid parenchyma with focally papillary architecture. There were intranuclear inclusion and the result was suspicious. Given suspicious result, she underwent thyroidectomy which was consistent with right follicular variant of papillary thyroid cancer sized 1.9x1.4x1 cm with no extrathyroidal extension and margins were negative. She received I131 radioactive iodine treatment 100.4 mCi on 9/24/2007.    She did have low stimulated thyroglobulin and negative WBS uptake in 2008.    She has had hypertension particularly when she came to doctor's office. She had full evaluation for secondary hypertension including 24 hours metanephrine, cortisol and VMA which were all negative. Both systolic and diastolic blood pressure have been high. She takes metoprolol 50 mg daily. She told me that her home blood pressures were good. She did have hysterectomy with preserved one ovary due to ovarian cyst around 2008.     Interim history  She continues to have anxiety. She could not sleep well. She is not currently taking medication for anxiety.     Her TFT was abnormal with TSH of 0.127, FT4 1.99 on 4/11/2022. Synthroid dose was adjusted to 75 mcg daily (from 75 mcg daily alternating with 88 mcg). She denies chest pain, shortness of breath, temperature intolerance, altered bowel movement. She has not notice any lump in her neck. No difficulty swallowing or breathing.    Past Medical History  Past Medical History:   Diagnosis Date     Thyroid cancer (H) 2007      Allergies  Allergies   Allergen Reactions     Penicillins      Medications  Current Outpatient Medications   Medication Sig Dispense Refill     METOPROLOL SUCCINATE ER PO Take 50 mg by mouth daily       SYNTHROID 75 MCG tablet Take 1 tablet (75 mcg) by mouth every other day *alternate with 88 mcg tablet. 45 tablet 3     Family History  family history includes Heart Disease in her father; Hypertension in her father and mother; Lipids in her father and mother.     No thyroid cancer in the family.     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 Margins were negative and there was no evidence on the left side.    Outside lab  Lab from [facility]  7/19/2007: TPO1.5 (0-8.9).   9/14/2007: TSH 34.4, Tg 2.8 (0-33), ROXANN <1.8  11/5/2007: TSH 1.46, FT4 1.2  12/27/2007: TSH 0.05  2/25/2008: TSH 0.64  5/19/2008: TSH 0.05, FT4 1.5  8/8/2008: TSH 0.54, FT4 1.3, Tg 0.4, ROXANN <1.8  11/24/2008: TSH 0.05, FT4 1.7, Tg 1.6, ROXANN <1.8  1/29/2009: TSH 0.06, FT4 1.3  6/1/2009; TSH 0.04, FT4 1.3, Tg 0.3, ROXANN<1.8  4/20/2010: TSH 0.02, FT4 1.5, Tg 0.4, ROXANN <1.8  8/9/2010: TSH 0.02, FT4 1.4  10/21/2010: TSH 0.02, FT4 1.4  12/21/2010: TSH 0.12, FT4 1.2  7/5/2011: TSH 0.28, FT4 1.2, Tg 0.6, ROXANN<20  6/25/2012: TSH 0.27, FT4 1.4, Tg 0.9, ROXANN<20  9/12/2013: TSH 0.82, FT4 1.4, Tg 1.1, ROXANN <20  10/28/2013: TSH 0.22, FT4 1.5, Tg 0.8, ROXANN<20    Lab from [facility]  6/25/14: TSH 0.53  9/24/15: TSH 0.22  12/29/15: TSH 1.09    Lab from [facility]  10/3/2016: TSH 0.7,  free T4 1.22, thyroglobulin 1.2 and thyroglobulin antibodies less than 0.4.  2/26/2018:  TSH 1.04, free T4 1.11, thyroglobulin 1.7 and thyroglobulin antibodies less than 0.4.  8/8/2018: TSH 1.63, FT4 1.14  6/3/2019: TSH 0.66, FT4 1.27, thyroglobulin 1.5, TgAb <0.4  6/7/2021; TSH 0.45, FT4 1.3, thyroglobulin 1.3, TgAb<0.4    Imaging from [facility]  9/19/2007: I123 scan -- no focal area of residual uptake within thyroid bed. There was residual uptake within the thyroid bed. 24 hour uptake 2.2%.  9/28/2007: post treatment scan -- focus of increased activity in the thyroid bed consistent with residual thyroid activity and focus of activity in the left hemipelvis which could like within the ovary.   10/4/2007: CT scan: 4.2 cm low attenuation lesion within the left adnexa likely representing of ovarian origin.   9/17/2008: US thyroid -- 5 mm echogenic area in the left thyroid bed with posterior shadowing. No evidence of residual thyroid tissue   11/26/2008: WBS -- very faint midline neck activity  4/20/2010: US thyroid -- no residual or recurrent mass seen in thyroid bed.  7/5/2011: US thyroid -- negative exam  6/25/2012: US thyroid -- postsurgical changes of thyroidectomy with no sonographic findings of recurrence.      ASSESSMENT:    Addi is a 51 years old female with hx of papillary thyroid cancer who has telephone visit to follow-up papillary thyroid cancer s/p total thyroidectomy and I131 radioactive iodine ablation    1) Papillary thyroid cancer s/p total thyroidectomy and I131 radioactive iodine ablation: diagnosed in 2007. Pathology showed follicular variant of PTC without lymph node metastasis. Ultrasound neck in 2012 did not show any suspicious finding she is low risk.  Stimulated Tg was low.     Her TFT was abnormal with TSH of 0.127, FT4 1.99 on 4/11/2022. Synthroid dose was adjusted to 75 mcg daily (from 75 mcg daily alternating with 88 mcg).    Will check TSH, FT4 and Tg today.    2) Postsurgical/ablative  hypothyroidism: She has had PTC for long time and was on suppression therapy many years after surgery. I think we do not need to keep on suppressed TSH level given low risk.   Will check TSH and FT4 today.    3) Hypertension: both systolic and diastolic. Did have work up for secondary hypertension in the past which were negative. Currently on metoprolol ER 50 mg daily.    Advise her to discuss with PCP     4) Anxiety: seem to get worse during pandemic. +insomnia. Recommend her to discuss with PCP.    PLAN:   Check TSH, FT4, TT3 and Thyroglobulin and Ab  Schedule ultrasound neck  RTC 1 year    External notes/medical records independently reviewed, labs and imaging independently reviewed, medical management and tests to be discussed/communicated to patient.    Time: I spent 56 minutes spent on the date of the encounter preparing to see patient (including chart review and preparation), obtaining and or reviewing additional medical history, performing a physical exam and evaluation, documenting clinical information in the electronic health record, independently interpreting results, communicating results to the patient and coordinating care.    Ifrah Rodrigues MD, MS  Division of Diabetes and Endocrinology  Department of Medicine

## 2022-06-14 LAB — T3 SERPL-MCNC: 97 NG/DL (ref 60–181)

## 2022-06-17 LAB — SCANNED LAB RESULT: NORMAL

## 2022-06-20 ENCOUNTER — MYC MEDICAL ADVICE (OUTPATIENT)
Dept: ENDOCRINOLOGY | Facility: CLINIC | Age: 52
End: 2022-06-20

## 2022-06-20 DIAGNOSIS — E89.0 POSTOPERATIVE HYPOTHYROIDISM: ICD-10-CM

## 2022-06-20 DIAGNOSIS — C73 PAPILLARY THYROID CARCINOMA (H): ICD-10-CM

## 2022-06-20 DIAGNOSIS — C73 PAPILLARY THYROID CARCINOMA (H): Primary | ICD-10-CM

## 2022-06-24 RX ORDER — LEVOTHYROXINE SODIUM 75 MCG
75 TABLET ORAL DAILY
Qty: 90 TABLET | Refills: 3 | Status: SHIPPED | OUTPATIENT
Start: 2022-06-24 | End: 2023-06-20

## 2022-09-16 ENCOUNTER — LAB (OUTPATIENT)
Dept: LAB | Facility: CLINIC | Age: 52
End: 2022-09-16
Payer: COMMERCIAL

## 2022-09-16 DIAGNOSIS — C73 PAPILLARY THYROID CARCINOMA (H): ICD-10-CM

## 2022-09-16 DIAGNOSIS — E89.0 POSTOPERATIVE HYPOTHYROIDISM: ICD-10-CM

## 2022-09-16 LAB
T4 FREE SERPL-MCNC: 1.15 NG/DL (ref 0.76–1.46)
TSH SERPL DL<=0.005 MIU/L-ACNC: 0.44 MU/L (ref 0.4–4)

## 2022-09-16 PROCEDURE — 84443 ASSAY THYROID STIM HORMONE: CPT

## 2022-09-16 PROCEDURE — 84439 ASSAY OF FREE THYROXINE: CPT

## 2022-09-16 PROCEDURE — 36415 COLL VENOUS BLD VENIPUNCTURE: CPT

## 2023-04-22 ENCOUNTER — HEALTH MAINTENANCE LETTER (OUTPATIENT)
Age: 53
End: 2023-04-22

## 2023-06-02 ENCOUNTER — HEALTH MAINTENANCE LETTER (OUTPATIENT)
Age: 53
End: 2023-06-02

## 2023-06-16 DIAGNOSIS — C73 PAPILLARY THYROID CARCINOMA (H): ICD-10-CM

## 2023-06-16 DIAGNOSIS — E89.0 POSTOPERATIVE HYPOTHYROIDISM: ICD-10-CM

## 2023-06-20 RX ORDER — LEVOTHYROXINE SODIUM 75 MCG
TABLET ORAL
Qty: 30 TABLET | Refills: 0 | Status: SHIPPED | OUTPATIENT
Start: 2023-06-20 | End: 2023-07-18

## 2023-06-20 NOTE — TELEPHONE ENCOUNTER
SYNTHROID 75 MCG tablet       Last Written Prescription Date:  06-  Last Fill Quantity: 90,   # refills: 3  Last Office Visit : 6-  Future Office visit:  7-    Routing refill request to provider for review/approval because:  Thyroid Protocol Failed 06/16/2023 09:05 AM   Protocol Details  Recent (12 mo) or future (30 days) visit within the authorizing provider's specialty

## 2023-07-14 DIAGNOSIS — E89.0 POSTOPERATIVE HYPOTHYROIDISM: ICD-10-CM

## 2023-07-14 DIAGNOSIS — C73 PAPILLARY THYROID CARCINOMA (H): ICD-10-CM

## 2023-07-16 ASSESSMENT — ENCOUNTER SYMPTOMS
MYALGIAS: 0
MUSCLE CRAMPS: 0
MUSCLE WEAKNESS: 0
JOINT SWELLING: 1
ARTHRALGIAS: 1
BACK PAIN: 0
STIFFNESS: 1

## 2023-07-17 ENCOUNTER — OFFICE VISIT (OUTPATIENT)
Dept: ENDOCRINOLOGY | Facility: CLINIC | Age: 53
End: 2023-07-17
Payer: COMMERCIAL

## 2023-07-17 VITALS
HEART RATE: 93 BPM | SYSTOLIC BLOOD PRESSURE: 189 MMHG | BODY MASS INDEX: 18.95 KG/M2 | WEIGHT: 103 LBS | HEIGHT: 62 IN | DIASTOLIC BLOOD PRESSURE: 122 MMHG | OXYGEN SATURATION: 100 %

## 2023-07-17 DIAGNOSIS — C73 PAPILLARY THYROID CARCINOMA (H): ICD-10-CM

## 2023-07-17 DIAGNOSIS — E89.0 POSTOPERATIVE HYPOTHYROIDISM: ICD-10-CM

## 2023-07-17 LAB
T4 FREE SERPL-MCNC: 1.77 NG/DL (ref 0.9–1.7)
TSH SERPL DL<=0.005 MIU/L-ACNC: 0.22 UIU/ML (ref 0.3–4.2)

## 2023-07-17 PROCEDURE — 36415 COLL VENOUS BLD VENIPUNCTURE: CPT | Performed by: INTERNAL MEDICINE

## 2023-07-17 PROCEDURE — 84439 ASSAY OF FREE THYROXINE: CPT | Performed by: INTERNAL MEDICINE

## 2023-07-17 PROCEDURE — 99214 OFFICE O/P EST MOD 30 MIN: CPT | Performed by: INTERNAL MEDICINE

## 2023-07-17 PROCEDURE — 99000 SPECIMEN HANDLING OFFICE-LAB: CPT | Performed by: INTERNAL MEDICINE

## 2023-07-17 PROCEDURE — 84432 ASSAY OF THYROGLOBULIN: CPT | Mod: 90 | Performed by: INTERNAL MEDICINE

## 2023-07-17 PROCEDURE — 86800 THYROGLOBULIN ANTIBODY: CPT | Mod: 90 | Performed by: INTERNAL MEDICINE

## 2023-07-17 PROCEDURE — 84443 ASSAY THYROID STIM HORMONE: CPT | Performed by: INTERNAL MEDICINE

## 2023-07-17 RX ORDER — LEVOTHYROXINE SODIUM 75 MCG
TABLET ORAL
Qty: 90 TABLET | Refills: 3 | Status: CANCELLED | OUTPATIENT
Start: 2023-07-17

## 2023-07-17 NOTE — NURSING NOTE
"Addi Flores's goals for this visit include:   Chief Complaint   Patient presents with     RECHECK     Thyroid     She requests these members of her care team be copied on today's visit information: yes    PCP: Nichelle Melo    Referring Provider:  No referring provider defined for this encounter.    BP (!) 202/120 (BP Location: Left arm, Patient Position: Sitting, Cuff Size: Adult Regular)   Pulse 98   Ht 1.562 m (5' 1.5\")   Wt 46.7 kg (103 lb)   LMP 06/21/2008   SpO2 100%   BMI 19.15 kg/m      Do you need any medication refills at today's visit? yes    WILLOW Delgado  Adult Endocrinology  [facility]    "

## 2023-07-17 NOTE — LETTER
7/17/2023         RE: Addi Flores  [address] [facility] [place] [id number]        Dear Colleague,    Thank you for referring your patient, Addi Flores, to the [facility]. Please see a copy of my visit note below.    Endocrinology Note         Addi is a 52 year old femalewho has a visit today for papillary thyroid cancer    HPI  Addi is a 52 years old female with anxiety, hx of papillary thyroid cancer who has a visit for follow up follicular variant of papillary thyroid cancer    She was diagnosed with papillary thyroid cancer in 2007 after she noted to have palpable thyroid nodule by her dermatologist. She did have US guided FNA of the nodule that showed enlarged nuclei in the thyroid parenchyma with focally papillary architecture. There were intranuclear inclusion and the result was suspicious. Given suspicious result, she underwent thyroidectomy which was consistent with right follicular variant of papillary thyroid cancer sized 1.9x1.4x1 cm with no extrathyroidal extension and margins were negative. She received I131 radioactive iodine treatment 100.4 mCi on 9/24/2007.    She did have low stimulated thyroglobulin and negative WBS uptake in 2008.    She has had hypertension particularly when she came to doctor's office. She had full evaluation for secondary hypertension including 24 hours metanephrine, cortisol and VMA which were all negative. Both systolic and diastolic blood pressure have been high. She takes metoprolol 50 mg daily. She told me that her home blood pressures were good. She did have hysterectomy with preserved one ovary due to ovarian cyst around 2008.     Interim history  She continues to have anxiety. She could not sleep well. She is not currently taking medication for anxiety.   She is taking metoprolol 50 mg daily.  She saw cardiologist last year for hypertension. She stated that her blood pressure is affected by situational anxiety.  Her BP had been in 170-200s systolic and 100-120 diastolic during clinic visit. She ports that her home blood pressures were always normal. She has mild elevation of serum metanephrine, normal plasma aldosterone and renin in 2021. US upper abdomen in 2022 was normal.    She was prescribed low dose amlodipine but has not started yet due to fear of side effects.    She is currently on Synthroid 75 mcg daily. She denies chest pain, shortness of breath, temperature intolerance, altered bowel movement. She has not notice any lump in her neck. No difficulty swallowing or breathing.    US neck in 6/2022 was normal.    Past Medical History  Past Medical History:   Diagnosis Date    Thyroid cancer (H) 2007     Allergies  Allergies   Allergen Reactions    Penicillins      Medications  Current Outpatient Medications   Medication Sig Dispense Refill    METOPROLOL SUCCINATE ER PO Take 50 mg by mouth daily      SYNTHROID 75 MCG tablet Take 1 tablet (75 mcg) by mouth daily - Oral, please keep 07- appt 30 tablet 0     Family History  family history includes Heart Disease in her father; Hypertension in her father and mother; Lipids in her father and mother.     No thyroid cancer in the family.     Social History  Social History     Tobacco Use    Smoking status: Never    Smokeless tobacco: Never   Substance Use Topics    Alcohol use: Yes     Comment: occasional    Drug use: No   , has 2 children  Currently is stay at home mother     ROS  10 points ROS were negative otherwise mentioned in HPI  +anxiety  + insomnia    Physical Exam  Constitutional: no distress, comfortable, pleasant     RESULTS  Surgical pathology: 2007  Papillary thyroid cancer sized 1.9x1.4x1 cm with no extrathyroidal extension. Margins were negative and there was no evidence on the left side.    Outside lab  Lab from [facility]  7/19/2007: TPO1.5 (0-8.9).   9/14/2007: TSH 34.4, Tg 2.8 (0-33), ROXANN <1.8  11/5/2007: TSH 1.46, FT4 1.2  12/27/2007: TSH  0.05  2/25/2008: TSH 0.64  5/19/2008: TSH 0.05, FT4 1.5  8/8/2008: TSH 0.54, FT4 1.3, Tg 0.4, ROXANN <1.8  11/24/2008: TSH 0.05, FT4 1.7, Tg 1.6, ROXANN <1.8  1/29/2009: TSH 0.06, FT4 1.3  6/1/2009; TSH 0.04, FT4 1.3, Tg 0.3, ROXANN<1.8  4/20/2010: TSH 0.02, FT4 1.5, Tg 0.4, ROXANN <1.8  8/9/2010: TSH 0.02, FT4 1.4  10/21/2010: TSH 0.02, FT4 1.4  12/21/2010: TSH 0.12, FT4 1.2  7/5/2011: TSH 0.28, FT4 1.2, Tg 0.6, ROXANN<20  6/25/2012: TSH 0.27, FT4 1.4, Tg 0.9, ROXANN<20  9/12/2013: TSH 0.82, FT4 1.4, Tg 1.1, ROXANN <20  10/28/2013: TSH 0.22, FT4 1.5, Tg 0.8, ROXANN<20    Lab from [facility]  6/25/14: TSH 0.53  9/24/15: TSH 0.22  12/29/15: TSH 1.09    Lab from [facility]  10/3/2016: TSH 0.7, free T4 1.22, thyroglobulin 1.2 and thyroglobulin antibodies less than 0.4.  2/26/2018:  TSH 1.04, free T4 1.11, thyroglobulin 1.7 and thyroglobulin antibodies less than 0.4.  8/8/2018: TSH 1.63, FT4 1.14  6/3/2019: TSH 0.66, FT4 1.27, thyroglobulin 1.5, TgAb <0.4  6/7/2021; TSH 0.45, FT4 1.3, thyroglobulin 1.3, TgAb<0.4    Imaging from [facility]  9/19/2007: I123 scan -- no focal area of residual uptake within thyroid bed. There was residual uptake within the thyroid bed. 24 hour uptake 2.2%.  9/28/2007: post treatment scan -- focus of increased activity in the thyroid bed consistent with residual thyroid activity and focus of activity in the left hemipelvis which could like within the ovary.   10/4/2007: CT scan: 4.2 cm low attenuation lesion within the left adnexa likely representing of ovarian origin.   9/17/2008: US thyroid -- 5 mm echogenic area in the left thyroid bed with posterior shadowing. No evidence of residual thyroid tissue   11/26/2008: WBS -- very faint midline neck activity  4/20/2010: US thyroid -- no residual or recurrent mass seen in thyroid bed.  7/5/2011: US thyroid -- negative exam  6/25/2012: US thyroid -- postsurgical changes of thyroidectomy with no sonographic findings of recurrence.            ASSESSMENT:    Addi juarez  a 52 years old female with hx of papillary thyroid cancer who has telephone visit to follow-up papillary thyroid cancer s/p total thyroidectomy and I131 radioactive iodine ablation    1) Papillary thyroid cancer s/p total thyroidectomy and I131 radioactive iodine ablation: diagnosed in 2007. Pathology showed follicular variant of PTC without lymph node metastasis. Ultrasound neck in 2012 did not show any suspicious finding she is low risk.  Stimulated Tg was low.   She is currently on Synthroid 75 mcg daily.  Will check TSH, FT4 and Tg today.    2) Postsurgical/ablative hypothyroidism: She has had PTC for long time and was on suppression therapy many years after surgery. I think we do not need to keep on suppressed TSH level given low risk.   Will check TSH and FT4 today.    3) Hypertension: both systolic and diastolic. Did have work up for secondary hypertension in the past which were negative. She has mild elevation of serum metanephrine, normal plasma aldosterone and renin in 2021. US upper abdomen was normal in 2022.  Currently on metoprolol ER 50 mg daily.    Advise her to discuss with PCP and cardiologist.     4) Anxiety: +insomnia. Recommend her to discuss with PCP.    PLAN:   Check TSH, FT4, TT3 and Thyroglobulin and Ab  Recommend to follow up with PCP and cardiologist for hypertension  RTC 1 year    External notes/medical records independently reviewed, labs and imaging independently reviewed, medical management and tests to be discussed/communicated to patient.    Time: I spent 31 minutes spent on the date of the encounter preparing to see patient (including chart review and preparation), obtaining and or reviewing additional medical history, performing a physical exam and evaluation, documenting clinical information in the electronic health record, independently interpreting results, communicating results to the patient and coordinating care.    Ifrah Rodrigues MD, MS  Division of Diabetes and  Endocrinology  Department of Medicine

## 2023-07-17 NOTE — PROGRESS NOTES
Endocrinology Note         Addi is a 52 year old femalewho has a visit today for papillary thyroid cancer    HPI  Addi is a 52 years old female with anxiety, hx of papillary thyroid cancer who has a visit for follow up follicular variant of papillary thyroid cancer    She was diagnosed with papillary thyroid cancer in 2007 after she noted to have palpable thyroid nodule by her dermatologist. She did have US guided FNA of the nodule that showed enlarged nuclei in the thyroid parenchyma with focally papillary architecture. There were intranuclear inclusion and the result was suspicious. Given suspicious result, she underwent thyroidectomy which was consistent with right follicular variant of papillary thyroid cancer sized 1.9x1.4x1 cm with no extrathyroidal extension and margins were negative. She received I131 radioactive iodine treatment 100.4 mCi on 9/24/2007.    She did have low stimulated thyroglobulin and negative WBS uptake in 2008.    She has had hypertension particularly when she came to doctor's office. She had full evaluation for secondary hypertension including 24 hours metanephrine, cortisol and VMA which were all negative. Both systolic and diastolic blood pressure have been high. She takes metoprolol 50 mg daily. She told me that her home blood pressures were good. She did have hysterectomy with preserved one ovary due to ovarian cyst around 2008.     Interim history  She continues to have anxiety. She could not sleep well. She is not currently taking medication for anxiety.   She is taking metoprolol 50 mg daily.  She saw cardiologist last year for hypertension. She stated that her blood pressure is affected by situational anxiety. Her BP had been in 170-200s systolic and 100-120 diastolic during clinic visit. She ports that her home blood pressures were always normal. She has mild elevation of serum metanephrine, normal plasma aldosterone and renin in 2021. US upper abdomen in 2022 was  normal.    She was prescribed low dose amlodipine but has not started yet due to fear of side effects.    She is currently on Synthroid 75 mcg daily. She denies chest pain, shortness of breath, temperature intolerance, altered bowel movement. She has not notice any lump in her neck. No difficulty swallowing or breathing.    US neck in 6/2022 was normal.    Past Medical History  Past Medical History:   Diagnosis Date     Thyroid cancer (H) 2007     Allergies  Allergies   Allergen Reactions     Penicillins      Medications  Current Outpatient Medications   Medication Sig Dispense Refill     METOPROLOL SUCCINATE ER PO Take 50 mg by mouth daily       SYNTHROID 75 MCG tablet Take 1 tablet (75 mcg) by mouth daily - Oral, please keep 07- appt 30 tablet 0     Family History  family history includes Heart Disease in her father; Hypertension in her father and mother; Lipids in her father and mother.     No thyroid cancer in the family.     Social History  Social History     Tobacco Use     Smoking status: Never     Smokeless tobacco: Never   Substance Use Topics     Alcohol use: Yes     Comment: occasional     Drug use: No   , has 2 children  Currently is stay at home mother     ROS  10 points ROS were negative otherwise mentioned in HPI  +anxiety  + insomnia    Physical Exam  Constitutional: no distress, comfortable, pleasant     RESULTS  Surgical pathology: 2007  Papillary thyroid cancer sized 1.9x1.4x1 cm with no extrathyroidal extension. Margins were negative and there was no evidence on the left side.    Outside lab  Lab from [facility]  7/19/2007: TPO1.5 (0-8.9).   9/14/2007: TSH 34.4, Tg 2.8 (0-33), ROXANN <1.8  11/5/2007: TSH 1.46, FT4 1.2  12/27/2007: TSH 0.05  2/25/2008: TSH 0.64  5/19/2008: TSH 0.05, FT4 1.5  8/8/2008: TSH 0.54, FT4 1.3, Tg 0.4, ROXANN <1.8  11/24/2008: TSH 0.05, FT4 1.7, Tg 1.6, ROXANN <1.8  1/29/2009: TSH 0.06, FT4 1.3  6/1/2009; TSH 0.04, FT4 1.3, Tg 0.3, ROXANN<1.8  4/20/2010: TSH 0.02, FT4  1.5, Tg 0.4, ROXANN <1.8  8/9/2010: TSH 0.02, FT4 1.4  10/21/2010: TSH 0.02, FT4 1.4  12/21/2010: TSH 0.12, FT4 1.2  7/5/2011: TSH 0.28, FT4 1.2, Tg 0.6, ROXANN<20  6/25/2012: TSH 0.27, FT4 1.4, Tg 0.9, ROXANN<20  9/12/2013: TSH 0.82, FT4 1.4, Tg 1.1, ROXANN <20  10/28/2013: TSH 0.22, FT4 1.5, Tg 0.8, ROXANN<20    Lab from [facility]  6/25/14: TSH 0.53  9/24/15: TSH 0.22  12/29/15: TSH 1.09    Lab from [facility]  10/3/2016: TSH 0.7, free T4 1.22, thyroglobulin 1.2 and thyroglobulin antibodies less than 0.4.  2/26/2018:  TSH 1.04, free T4 1.11, thyroglobulin 1.7 and thyroglobulin antibodies less than 0.4.  8/8/2018: TSH 1.63, FT4 1.14  6/3/2019: TSH 0.66, FT4 1.27, thyroglobulin 1.5, TgAb <0.4  6/7/2021; TSH 0.45, FT4 1.3, thyroglobulin 1.3, TgAb<0.4    Imaging from [facility]  9/19/2007: I123 scan -- no focal area of residual uptake within thyroid bed. There was residual uptake within the thyroid bed. 24 hour uptake 2.2%.  9/28/2007: post treatment scan -- focus of increased activity in the thyroid bed consistent with residual thyroid activity and focus of activity in the left hemipelvis which could like within the ovary.   10/4/2007: CT scan: 4.2 cm low attenuation lesion within the left adnexa likely representing of ovarian origin.   9/17/2008: US thyroid -- 5 mm echogenic area in the left thyroid bed with posterior shadowing. No evidence of residual thyroid tissue   11/26/2008: WBS -- very faint midline neck activity  4/20/2010: US thyroid -- no residual or recurrent mass seen in thyroid bed.  7/5/2011: US thyroid -- negative exam  6/25/2012: US thyroid -- postsurgical changes of thyroidectomy with no sonographic findings of recurrence.            ASSESSMENT:    Addi is a 52 years old female with hx of papillary thyroid cancer who has telephone visit to follow-up papillary thyroid cancer s/p total thyroidectomy and I131 radioactive iodine ablation    1) Papillary thyroid cancer s/p total thyroidectomy and I131  radioactive iodine ablation: diagnosed in 2007. Pathology showed follicular variant of PTC without lymph node metastasis. Ultrasound neck in 2012 did not show any suspicious finding she is low risk.  Stimulated Tg was low.   She is currently on Synthroid 75 mcg daily.  Will check TSH, FT4 and Tg today.    2) Postsurgical/ablative hypothyroidism: She has had PTC for long time and was on suppression therapy many years after surgery. I think we do not need to keep on suppressed TSH level given low risk.   Will check TSH and FT4 today.    3) Hypertension: both systolic and diastolic. Did have work up for secondary hypertension in the past which were negative. She has mild elevation of serum metanephrine, normal plasma aldosterone and renin in 2021. US upper abdomen was normal in 2022.  Currently on metoprolol ER 50 mg daily.    Advise her to discuss with PCP and cardiologist.     4) Anxiety: +insomnia. Recommend her to discuss with PCP.    PLAN:   Check TSH, FT4, TT3 and Thyroglobulin and Ab  Recommend to follow up with PCP and cardiologist for hypertension  RTC 1 year    External notes/medical records independently reviewed, labs and imaging independently reviewed, medical management and tests to be discussed/communicated to patient.    Time: I spent 31 minutes spent on the date of the encounter preparing to see patient (including chart review and preparation), obtaining and or reviewing additional medical history, performing a physical exam and evaluation, documenting clinical information in the electronic health record, independently interpreting results, communicating results to the patient and coordinating care.    Ifrah Rodrigues MD, MS  Division of Diabetes and Endocrinology  Department of Medicine

## 2023-07-18 RX ORDER — LEVOTHYROXINE SODIUM 75 MCG
TABLET ORAL
Qty: 90 TABLET | Refills: 3 | Status: SHIPPED | OUTPATIENT
Start: 2023-07-18 | End: 2023-12-06

## 2023-07-21 LAB — SCANNED LAB RESULT: NORMAL

## 2023-12-05 ENCOUNTER — LAB (OUTPATIENT)
Dept: LAB | Facility: CLINIC | Age: 53
End: 2023-12-05
Payer: COMMERCIAL

## 2023-12-05 DIAGNOSIS — C73 PAPILLARY THYROID CARCINOMA (H): ICD-10-CM

## 2023-12-05 DIAGNOSIS — E89.0 POSTOPERATIVE HYPOTHYROIDISM: ICD-10-CM

## 2023-12-05 LAB
T4 FREE SERPL-MCNC: 1.33 NG/DL (ref 0.9–1.7)
TSH SERPL DL<=0.005 MIU/L-ACNC: 4.64 UIU/ML (ref 0.3–4.2)

## 2023-12-05 PROCEDURE — 84439 ASSAY OF FREE THYROXINE: CPT

## 2023-12-05 PROCEDURE — 36415 COLL VENOUS BLD VENIPUNCTURE: CPT

## 2023-12-05 PROCEDURE — 84443 ASSAY THYROID STIM HORMONE: CPT

## 2023-12-06 DIAGNOSIS — C73 PAPILLARY THYROID CARCINOMA (H): ICD-10-CM

## 2023-12-06 DIAGNOSIS — E89.0 POSTOPERATIVE HYPOTHYROIDISM: ICD-10-CM

## 2023-12-06 RX ORDER — LEVOTHYROXINE SODIUM 75 MCG
75 TABLET ORAL DAILY
Qty: 90 TABLET | Refills: 3 | Status: SHIPPED | OUTPATIENT
Start: 2023-12-06

## 2024-06-28 ENCOUNTER — LAB (OUTPATIENT)
Dept: LAB | Facility: CLINIC | Age: 54
End: 2024-06-28
Payer: COMMERCIAL

## 2024-06-28 DIAGNOSIS — C73 PAPILLARY THYROID CARCINOMA (H): ICD-10-CM

## 2024-06-28 DIAGNOSIS — E89.0 POSTOPERATIVE HYPOTHYROIDISM: ICD-10-CM

## 2024-06-28 LAB
T4 FREE SERPL-MCNC: 1.75 NG/DL (ref 0.9–1.7)
TSH SERPL DL<=0.005 MIU/L-ACNC: 0.83 UIU/ML (ref 0.3–4.2)

## 2024-06-28 PROCEDURE — 84443 ASSAY THYROID STIM HORMONE: CPT

## 2024-06-28 PROCEDURE — 84439 ASSAY OF FREE THYROXINE: CPT

## 2024-06-28 PROCEDURE — 36415 COLL VENOUS BLD VENIPUNCTURE: CPT

## 2024-09-07 ENCOUNTER — HEALTH MAINTENANCE LETTER (OUTPATIENT)
Age: 54
End: 2024-09-07

## 2024-12-25 DIAGNOSIS — C73 PAPILLARY THYROID CARCINOMA (H): ICD-10-CM

## 2024-12-25 DIAGNOSIS — E89.0 POSTOPERATIVE HYPOTHYROIDISM: ICD-10-CM

## 2024-12-30 ENCOUNTER — MYC REFILL (OUTPATIENT)
Dept: ENDOCRINOLOGY | Facility: CLINIC | Age: 54
End: 2024-12-30
Payer: COMMERCIAL

## 2024-12-30 DIAGNOSIS — E89.0 POSTOPERATIVE HYPOTHYROIDISM: ICD-10-CM

## 2024-12-30 DIAGNOSIS — C73 PAPILLARY THYROID CARCINOMA (H): ICD-10-CM

## 2024-12-30 RX ORDER — LEVOTHYROXINE SODIUM 75 MCG
75 TABLET ORAL DAILY
Qty: 90 TABLET | Refills: 1 | Status: SHIPPED | OUTPATIENT
Start: 2024-12-30

## 2025-01-01 DIAGNOSIS — E89.0 POSTOPERATIVE HYPOTHYROIDISM: ICD-10-CM

## 2025-01-01 DIAGNOSIS — C73 PAPILLARY THYROID CARCINOMA (H): Primary | ICD-10-CM

## 2025-01-01 RX ORDER — LEVOTHYROXINE SODIUM 75 MCG
75 TABLET ORAL DAILY
Qty: 90 TABLET | Refills: 3 | Status: SHIPPED | OUTPATIENT
Start: 2025-01-01

## 2025-05-11 ENCOUNTER — HEALTH MAINTENANCE LETTER (OUTPATIENT)
Age: 55
End: 2025-05-11